# Patient Record
Sex: FEMALE | Race: WHITE | Employment: OTHER | URBAN - METROPOLITAN AREA
[De-identification: names, ages, dates, MRNs, and addresses within clinical notes are randomized per-mention and may not be internally consistent; named-entity substitution may affect disease eponyms.]

---

## 2017-03-30 RX ORDER — ESOMEPRAZOLE MAGNESIUM 40 MG/1
40 CAPSULE, DELAYED RELEASE ORAL DAILY
Qty: 30 CAPSULE | Refills: 2 | Status: SHIPPED | OUTPATIENT
Start: 2017-03-30 | End: 2017-07-01 | Stop reason: SDUPTHER

## 2017-09-21 ENCOUNTER — TELEPHONE (OUTPATIENT)
Dept: ADMINISTRATIVE | Age: 72
End: 2017-09-21

## 2017-12-04 ENCOUNTER — OFFICE VISIT (OUTPATIENT)
Dept: INTERNAL MEDICINE | Age: 72
End: 2017-12-04

## 2017-12-04 VITALS
HEIGHT: 66 IN | TEMPERATURE: 97.5 F | OXYGEN SATURATION: 96 % | DIASTOLIC BLOOD PRESSURE: 82 MMHG | SYSTOLIC BLOOD PRESSURE: 110 MMHG | RESPIRATION RATE: 16 BRPM | HEART RATE: 83 BPM

## 2017-12-04 DIAGNOSIS — F33.42 RECURRENT MAJOR DEPRESSIVE EPISODES, IN FULL REMISSION (HCC): ICD-10-CM

## 2017-12-04 DIAGNOSIS — Z00.00 ENCOUNTER FOR MEDICARE ANNUAL WELLNESS EXAM: ICD-10-CM

## 2017-12-04 DIAGNOSIS — Z00.00 ROUTINE GENERAL MEDICAL EXAMINATION AT A HEALTH CARE FACILITY: ICD-10-CM

## 2017-12-04 DIAGNOSIS — Z23 NEED FOR PROPHYLACTIC VACCINATION AGAINST STREPTOCOCCUS PNEUMONIAE (PNEUMOCOCCUS): ICD-10-CM

## 2017-12-04 PROCEDURE — 90670 PCV13 VACCINE IM: CPT | Performed by: FAMILY MEDICINE

## 2017-12-04 PROCEDURE — G0438 PPPS, INITIAL VISIT: HCPCS | Performed by: FAMILY MEDICINE

## 2017-12-04 PROCEDURE — G0009 ADMIN PNEUMOCOCCAL VACCINE: HCPCS | Performed by: FAMILY MEDICINE

## 2017-12-04 RX ORDER — GABAPENTIN 100 MG/1
2 CAPSULE ORAL 3 TIMES DAILY
COMMUNITY
Start: 2017-11-15 | End: 2019-10-07

## 2017-12-04 ASSESSMENT — LIFESTYLE VARIABLES
HOW OFTEN DURING THE LAST YEAR HAVE YOU FOUND THAT YOU WERE NOT ABLE TO STOP DRINKING ONCE YOU HAD STARTED: 0
AUDIT TOTAL SCORE: 2
HOW OFTEN DURING THE LAST YEAR HAVE YOU NEEDED AN ALCOHOLIC DRINK FIRST THING IN THE MORNING TO GET YOURSELF GOING AFTER A NIGHT OF HEAVY DRINKING: 0
HOW OFTEN DURING THE LAST YEAR HAVE YOU FAILED TO DO WHAT WAS NORMALLY EXPECTED FROM YOU BECAUSE OF DRINKING: 0
HOW OFTEN DO YOU HAVE SIX OR MORE DRINKS ON ONE OCCASION: 1
HAVE YOU OR SOMEONE ELSE BEEN INJURED AS A RESULT OF YOUR DRINKING: 0
HAS A RELATIVE, FRIEND, DOCTOR, OR ANOTHER HEALTH PROFESSIONAL EXPRESSED CONCERN ABOUT YOUR DRINKING OR SUGGESTED YOU CUT DOWN: 0
AUDIT-C TOTAL SCORE: 2
HOW OFTEN DURING THE LAST YEAR HAVE YOU BEEN UNABLE TO REMEMBER WHAT HAPPENED THE NIGHT BEFORE BECAUSE YOU HAD BEEN DRINKING: 0
HOW OFTEN DO YOU HAVE A DRINK CONTAINING ALCOHOL: 1
HOW OFTEN DURING THE LAST YEAR HAVE YOU HAD A FEELING OF GUILT OR REMORSE AFTER DRINKING: 0
HOW MANY STANDARD DRINKS CONTAINING ALCOHOL DO YOU HAVE ON A TYPICAL DAY: 0

## 2017-12-04 ASSESSMENT — PATIENT HEALTH QUESTIONNAIRE - PHQ9: SUM OF ALL RESPONSES TO PHQ QUESTIONS 1-9: 0

## 2017-12-04 ASSESSMENT — ANXIETY QUESTIONNAIRES: GAD7 TOTAL SCORE: 2

## 2017-12-04 NOTE — PATIENT INSTRUCTIONS
benefits include a comprehensive review of your medical history including lifestyle, illnesses that may run in your family, and various assessments and screenings as appropriate. After reviewing your medical record and screening and assessments performed today your provider may have ordered immunizations, labs, imaging, and/or referrals for you. A list of these orders (if applicable) as well as your Preventive Care list are included within your After Visit Summary for your review. Other Preventive Recommendations:    · A preventive eye exam performed by an eye specialist is recommended every 1-2 years to screen for glaucoma; cataracts, macular degeneration, and other eye disorders. · A preventive dental visit is recommended every 6 months. · Try to get at least 150 minutes of exercise per week or 10,000 steps per day on a pedometer . · Order or download the FREE \"Exercise & Physical Activity: Your Everyday Guide\" from [employer]. Call 3-607.555.1652 or search [employer] online. · You need 5263-4781 mg of calcium and 5842-2858 IU of vitamin D per day. It is possible to meet your calcium requirement with diet alone, but a vitamin D supplement is usually necessary to meet this goal.  · When exposed to the sun, use a sunscreen that protects against both UVA and UVB radiation with an SPF of 30 or greater. Reapply every 2 to 3 hours or after sweating, drying off with a towel, or swimming. · Always wear a seat belt when traveling in a car. Always wear a helmet when riding a bicycle or motorcycle.

## 2017-12-04 NOTE — PROGRESS NOTES
Medicare Annual Wellness Visit  Name: Odilia Root Date: 2017   MRN: [id number] Sex: Female   Age: 67 y.o. Ethnicity: Non-/Non    : 1945 Race: Dia Kilpatrick is here for Medicare AWV (Patient here today for Medicare annual wellness. )    Screenings for behavioral, psychosocial and functional/safety risks, and cognitive dysfunction are all negative except as indicated below. These results, as well as other patient data from the [address] form, are documented in Flowsheets linked to this Encounter. Allergies   Allergen Reactions    Penicillins      reaction in childhood; pt. does not remember       Prior to Visit Medications    Medication Sig Taking? Authorizing Provider   gabapentin (NEURONTIN) 100 MG capsule Take 2 capsules by mouth 3 times daily Yes Historical Provider, MD   NEXIUM 40 MG delayed release capsule TAKE 1 CAPSULE BY MOUTH DAILY Yes Cecile Vazquez PA-C   diphenoxylate-atropine (DIPHENATOL) 2.5-0.025 MG per tablet Take 1 tablet by mouth 4 times daily as needed for Diarrhea Yes Sandhya Rivers MD   Cholecalciferol (VITAMIN D3) 5000 UNITS CAPS Take 2 capsules by mouth daily. Yes Historical Provider, MD   pilocarpine (SALAGEN) 5 MG tablet Take 1 tablet by mouth nightly. Yes Historical Provider, MD   LORazepam (ATIVAN) 1 MG tablet Take 1 tablet by mouth daily as needed. Yes Historical Provider, MD   traMADol (ULTRAM) 50 MG tablet Take 1 tablet by mouth daily as needed. Yes Historical Provider, MD   traZODone (DESYREL) 50 MG tablet TAKE 1 TABLET BY MOUTH EVERY DAY AT NIGHT Yes Sandhya Rivers MD   LEXAPRO 20 MG tablet Take 1 tablet by mouth daily. Yes Sandhya Rivers MD   Multiple Vitamin (MULTIVITAMIN PO) Take  by mouth daily. Yes Historical Provider, MD   Biotin 1000 MCG TABS Take 1,000 mg by mouth daily. Yes Historical Provider, MD   B COMPLEX-C PO Take  by mouth daily.    Yes Historical Provider, MD   fish oil-omega-3 fatty acids
function. Hematology: Negative for bleeding and easy bruising. Immunology:  Negative for environmental allergies and food allergies.

## 2018-01-26 ENCOUNTER — OFFICE VISIT (OUTPATIENT)
Dept: INTERNAL MEDICINE CLINIC | Age: 73
End: 2018-01-26
Payer: MEDICARE

## 2018-01-26 VITALS
OXYGEN SATURATION: 97 % | DIASTOLIC BLOOD PRESSURE: 64 MMHG | HEART RATE: 86 BPM | SYSTOLIC BLOOD PRESSURE: 122 MMHG | RESPIRATION RATE: 16 BRPM | TEMPERATURE: 98.1 F | HEIGHT: 66 IN

## 2018-01-26 DIAGNOSIS — B35.3 TINEA PEDIS OF RIGHT FOOT: Primary | ICD-10-CM

## 2018-01-26 DIAGNOSIS — Z13.220 SCREENING, LIPID: ICD-10-CM

## 2018-01-26 PROCEDURE — 36415 COLL VENOUS BLD VENIPUNCTURE: CPT | Performed by: PHYSICIAN ASSISTANT

## 2018-01-26 PROCEDURE — 99213 OFFICE O/P EST LOW 20 MIN: CPT | Performed by: PHYSICIAN ASSISTANT

## 2018-01-26 RX ORDER — NYSTATIN 100000 U/G
CREAM TOPICAL
Qty: 45 G | Refills: 2 | Status: SHIPPED | OUTPATIENT
Start: 2018-01-26 | End: 2018-08-29 | Stop reason: ALTCHOICE

## 2018-01-26 RX ORDER — LACTULOSE 10 G/15ML
SOLUTION ORAL; RECTAL
Refills: 11 | COMMUNITY
Start: 2018-01-09 | End: 2019-10-23

## 2018-01-26 RX ORDER — CELECOXIB 100 MG/1
CAPSULE ORAL
COMMUNITY
Start: 2018-01-25 | End: 2021-01-26

## 2018-01-26 RX ORDER — NYSTATIN 100000 [USP'U]/G
POWDER TOPICAL
Qty: 1 BOTTLE | Refills: 2 | Status: SHIPPED | OUTPATIENT
Start: 2018-01-26 | End: 2018-08-29 | Stop reason: ALTCHOICE

## 2018-01-26 ASSESSMENT — ENCOUNTER SYMPTOMS
COUGH: 0
SORE THROAT: 0
CONSTIPATION: 1
EYE PAIN: 0
DOUBLE VISION: 0
NAUSEA: 0
VOMITING: 0
ABDOMINAL PAIN: 0
SHORTNESS OF BREATH: 0

## 2018-01-26 NOTE — PROGRESS NOTES
 No narrative on file     No family history on file. Allergies   Allergen Reactions    Penicillins      reaction in childhood; pt. does not remember     Current Outpatient Prescriptions   Medication Sig Dispense Refill    celecoxib (CELEBREX) 100 MG capsule TAKE 1 CAPSULE BY MOUTH TWICE DAILY.  lactulose encephalopathy 10 GM/15ML SOLN solution TAKE 30 ML BY MOUTH TWICE DAILY. 11    nystatin (MYCOSTATIN) 709740 UNIT/GM cream Apply topically 2 times daily. 45 g 2    nystatin (MYCOSTATIN) 712873 UNIT/GM powder Apply 3 times daily. 1 Bottle 2    gabapentin (NEURONTIN) 100 MG capsule Take 2 capsules by mouth 3 times daily      NEXIUM 40 MG delayed release capsule TAKE 1 CAPSULE BY MOUTH DAILY 30 capsule 3    diphenoxylate-atropine (DIPHENATOL) 2.5-0.025 MG per tablet Take 1 tablet by mouth 4 times daily as needed for Diarrhea 60 tablet 0    Cholecalciferol (VITAMIN D3) 5000 UNITS CAPS Take 2 capsules by mouth daily.  pilocarpine (SALAGEN) 5 MG tablet Take 1 tablet by mouth nightly.  LORazepam (ATIVAN) 1 MG tablet Take 1 tablet by mouth daily as needed.  traMADol (ULTRAM) 50 MG tablet Take 1 tablet by mouth daily as needed.  traZODone (DESYREL) 50 MG tablet TAKE 1 TABLET BY MOUTH EVERY DAY AT NIGHT 30 tablet 8    LEXAPRO 20 MG tablet Take 1 tablet by mouth daily. 60 tablet 0    Estrogens, Conjugated (PREMARIN VA) Place  vaginally as needed.  Multiple Vitamin (MULTIVITAMIN PO) Take  by mouth daily.  Biotin 1000 MCG TABS Take 1,000 mg by mouth daily.  B COMPLEX-C PO Take  by mouth daily.  fish oil-omega-3 fatty acids 1000 MG capsule Take 2 g by mouth daily.  Potassium 99 MG TABS Take 99 mg by mouth daily. No current facility-administered medications for this visit.         Objective    Vitals:    01/26/18 1012   BP: 122/64   Site: Left Arm   Position: Sitting   Cuff Size: Small Adult   Pulse: 86   Resp: 16   Temp: 98.1 °F (36.7 °C)   TempSrc:

## 2018-01-29 ENCOUNTER — TELEPHONE (OUTPATIENT)
Dept: INTERNAL MEDICINE CLINIC | Age: 73
End: 2018-01-29

## 2018-01-29 NOTE — TELEPHONE ENCOUNTER
Her chol numbers are good. The total cholesterol is 206 , the good HDL cholesterol is 63 and the bad LDL cholesterol is 122. The goal would be for the LDL to be less than 100. I think she I doing well.

## 2018-03-20 ENCOUNTER — OFFICE VISIT (OUTPATIENT)
Dept: INTERNAL MEDICINE CLINIC | Age: 73
End: 2018-03-20
Payer: MEDICARE

## 2018-03-20 VITALS
SYSTOLIC BLOOD PRESSURE: 110 MMHG | HEART RATE: 84 BPM | HEIGHT: 66 IN | OXYGEN SATURATION: 98 % | TEMPERATURE: 97.9 F | DIASTOLIC BLOOD PRESSURE: 70 MMHG | RESPIRATION RATE: 16 BRPM

## 2018-03-20 DIAGNOSIS — Z13.220 LIPID SCREENING: Primary | ICD-10-CM

## 2018-03-20 DIAGNOSIS — R07.89 ATYPICAL CHEST PAIN: ICD-10-CM

## 2018-03-20 PROCEDURE — 4040F PNEUMOC VAC/ADMIN/RCVD: CPT | Performed by: FAMILY MEDICINE

## 2018-03-20 PROCEDURE — 93000 ELECTROCARDIOGRAM COMPLETE: CPT | Performed by: FAMILY MEDICINE

## 2018-03-20 PROCEDURE — 99214 OFFICE O/P EST MOD 30 MIN: CPT | Performed by: FAMILY MEDICINE

## 2018-03-20 PROCEDURE — 1090F PRES/ABSN URINE INCON ASSESS: CPT | Performed by: FAMILY MEDICINE

## 2018-03-20 PROCEDURE — G8421 BMI NOT CALCULATED: HCPCS | Performed by: FAMILY MEDICINE

## 2018-03-20 PROCEDURE — G8482 FLU IMMUNIZE ORDER/ADMIN: HCPCS | Performed by: FAMILY MEDICINE

## 2018-03-20 PROCEDURE — 3014F SCREEN MAMMO DOC REV: CPT | Performed by: FAMILY MEDICINE

## 2018-03-20 PROCEDURE — 1036F TOBACCO NON-USER: CPT | Performed by: FAMILY MEDICINE

## 2018-03-20 PROCEDURE — 3017F COLORECTAL CA SCREEN DOC REV: CPT | Performed by: FAMILY MEDICINE

## 2018-03-20 PROCEDURE — G8399 PT W/DXA RESULTS DOCUMENT: HCPCS | Performed by: FAMILY MEDICINE

## 2018-03-20 PROCEDURE — 1123F ACP DISCUSS/DSCN MKR DOCD: CPT | Performed by: FAMILY MEDICINE

## 2018-03-20 PROCEDURE — G8427 DOCREV CUR MEDS BY ELIG CLIN: HCPCS | Performed by: FAMILY MEDICINE

## 2018-03-20 RX ORDER — TIZANIDINE 4 MG/1
1 TABLET ORAL DAILY
COMMUNITY
Start: 2018-02-08

## 2018-03-21 ENCOUNTER — NURSE ONLY (OUTPATIENT)
Dept: INTERNAL MEDICINE CLINIC | Age: 73
End: 2018-03-21
Payer: MEDICARE

## 2018-03-21 DIAGNOSIS — Z13.220 LIPID SCREENING: Primary | ICD-10-CM

## 2018-03-21 PROCEDURE — 36415 COLL VENOUS BLD VENIPUNCTURE: CPT | Performed by: FAMILY MEDICINE

## 2018-03-26 ENCOUNTER — TELEPHONE (OUTPATIENT)
Dept: INTERNAL MEDICINE CLINIC | Age: 73
End: 2018-03-26

## 2018-04-05 ENCOUNTER — HOSPITAL ENCOUNTER (OUTPATIENT)
Dept: NUCLEAR MEDICINE | Age: 73
Discharge: HOME | End: 2018-04-07
Payer: MEDICARE

## 2018-04-05 ENCOUNTER — HOSPITAL ENCOUNTER (OUTPATIENT)
Dept: NON INVASIVE DIAGNOSTICS | Age: 73
Discharge: HOME | End: 2018-04-05
Payer: MEDICARE

## 2018-04-05 DIAGNOSIS — R07.89 ATYPICAL CHEST PAIN: ICD-10-CM

## 2018-04-05 LAB
LV EF: 55 %
LVEF MODALITY: NORMAL

## 2018-04-05 PROCEDURE — 2580000003 HC RX 258: Performed by: FAMILY MEDICINE

## 2018-04-05 PROCEDURE — 93017 CV STRESS TEST TRACING ONLY: CPT

## 2018-04-05 PROCEDURE — 3430000000 HC RX DIAGNOSTIC RADIOPHARMACEUTICAL: Performed by: FAMILY MEDICINE

## 2018-04-05 PROCEDURE — 93306 TTE W/DOPPLER COMPLETE: CPT

## 2018-04-05 PROCEDURE — A9502 TC99M TETROFOSMIN: HCPCS | Performed by: FAMILY MEDICINE

## 2018-04-05 PROCEDURE — 78452 HT MUSCLE IMAGE SPECT MULT: CPT

## 2018-04-05 RX ORDER — SODIUM CHLORIDE 0.9 % (FLUSH) 0.9 %
10 SYRINGE (ML) INJECTION PRN
Status: DISCONTINUED | OUTPATIENT
Start: 2018-04-05 | End: 2018-04-08 | Stop reason: HOSPADM

## 2018-04-05 RX ADMIN — TETROFOSMIN 34.2 MILLICURIE: 0.23 INJECTION, POWDER, LYOPHILIZED, FOR SOLUTION INTRAVENOUS at 12:25

## 2018-04-05 RX ADMIN — Medication 10 ML: at 09:40

## 2018-04-05 RX ADMIN — Medication 10 ML: at 12:25

## 2018-04-05 RX ADMIN — TETROFOSMIN 11 MILLICURIE: 0.23 INJECTION, POWDER, LYOPHILIZED, FOR SOLUTION INTRAVENOUS at 09:40

## 2018-04-06 LAB
CHOLESTEROL, TOTAL: 209 MG/DL
CHOLESTEROL/HDL RATIO: 3.5
HDLC SERPL-MCNC: 59 MG/DL (ref 35–70)
LDL CHOLESTEROL CALCULATED: 129 MG/DL (ref 0–160)
TRIGL SERPL-MCNC: 105 MG/DL
VLDLC SERPL CALC-MCNC: NORMAL MG/DL

## 2018-08-29 ENCOUNTER — OFFICE VISIT (OUTPATIENT)
Dept: INTERNAL MEDICINE CLINIC | Age: 73
End: 2018-08-29
Payer: MEDICARE

## 2018-08-29 VITALS
OXYGEN SATURATION: 98 % | DIASTOLIC BLOOD PRESSURE: 64 MMHG | HEART RATE: 69 BPM | TEMPERATURE: 98 F | RESPIRATION RATE: 16 BRPM | SYSTOLIC BLOOD PRESSURE: 100 MMHG | BODY MASS INDEX: 20.83 KG/M2 | WEIGHT: 129.6 LBS | HEIGHT: 66 IN

## 2018-08-29 DIAGNOSIS — M35.00 SJOGREN'S SYNDROME, WITH UNSPECIFIED ORGAN INVOLVEMENT (HCC): Primary | ICD-10-CM

## 2018-08-29 DIAGNOSIS — Z23 NEED FOR PROPHYLACTIC VACCINATION AND INOCULATION AGAINST VARICELLA: ICD-10-CM

## 2018-08-29 DIAGNOSIS — F33.42 MAJOR DEPRESSIVE DISORDER, RECURRENT, IN FULL REMISSION (HCC): ICD-10-CM

## 2018-08-29 DIAGNOSIS — M81.0 SENILE OSTEOPOROSIS: ICD-10-CM

## 2018-08-29 DIAGNOSIS — F32.5 MAJOR DEPRESSIVE DISORDER IN FULL REMISSION, UNSPECIFIED WHETHER RECURRENT (HCC): ICD-10-CM

## 2018-08-29 DIAGNOSIS — G25.81 RESTLESS LEGS SYNDROME (RLS): ICD-10-CM

## 2018-08-29 DIAGNOSIS — H92.02 LEFT EAR PAIN: ICD-10-CM

## 2018-08-29 DIAGNOSIS — H61.22 EXCESSIVE CERUMEN IN LEFT EAR CANAL: ICD-10-CM

## 2018-08-29 PROCEDURE — 1036F TOBACCO NON-USER: CPT | Performed by: FAMILY MEDICINE

## 2018-08-29 PROCEDURE — 1090F PRES/ABSN URINE INCON ASSESS: CPT | Performed by: FAMILY MEDICINE

## 2018-08-29 PROCEDURE — 69210 REMOVE IMPACTED EAR WAX UNI: CPT | Performed by: FAMILY MEDICINE

## 2018-08-29 PROCEDURE — G8427 DOCREV CUR MEDS BY ELIG CLIN: HCPCS | Performed by: FAMILY MEDICINE

## 2018-08-29 PROCEDURE — G8420 CALC BMI NORM PARAMETERS: HCPCS | Performed by: FAMILY MEDICINE

## 2018-08-29 PROCEDURE — 1101F PT FALLS ASSESS-DOCD LE1/YR: CPT | Performed by: FAMILY MEDICINE

## 2018-08-29 PROCEDURE — 99214 OFFICE O/P EST MOD 30 MIN: CPT | Performed by: FAMILY MEDICINE

## 2018-08-29 PROCEDURE — 1123F ACP DISCUSS/DSCN MKR DOCD: CPT | Performed by: FAMILY MEDICINE

## 2018-08-29 PROCEDURE — 3017F COLORECTAL CA SCREEN DOC REV: CPT | Performed by: FAMILY MEDICINE

## 2018-08-29 PROCEDURE — G8399 PT W/DXA RESULTS DOCUMENT: HCPCS | Performed by: FAMILY MEDICINE

## 2018-08-29 PROCEDURE — 4040F PNEUMOC VAC/ADMIN/RCVD: CPT | Performed by: FAMILY MEDICINE

## 2018-08-29 NOTE — PROGRESS NOTES
Patient: Reina Raya    YOB: 1945    Date: 8/29/18    Chief Complaint   Patient presents with    Depression     6 month follow up.  Headache     She complains of headache today, neck pain. Patient Active Problem List    Diagnosis Date Noted    Senile osteoporosis     Gastroesophageal reflux disease without esophagitis 02/24/2016    Seborrheic keratosis 11/24/2015    Irritable bowel syndrome     Osteoarthritis of ankle or foot 07/23/2015    Tear film insufficiency 12/03/2014    Depression 05/21/2014    Osteoarthritis 05/21/2014    Sjogren's syndrome ([address] 05/09/2013    Fibromyalgia 05/09/2013    Restless legs syndrome (RLS) 08/12/2005    Persistent insomnia 08/12/2005       Allergies   Allergen Reactions    Penicillins      reaction in childhood; pt. does not remember       Vitals:    08/29/18 1552   BP: 100/64   Site: Right Arm   Position: Sitting   Cuff Size: Small Adult   Pulse: 69   Resp: 16   Temp: 98 °F (36.7 °C)   TempSrc: Oral   SpO2: 98%   Weight: 129 lb 9.6 oz (58.8 kg)   Height: 5' 6\" (1.676 m)      Body mass index is 20.92 kg/m². HPI    Her biggest issues that she feels she has some neck pain today she has gotten a massage yesterday and is just came on her and just feels tightness and she thinks related to the weather. It's giving her a bit of a headache. No numbness tingling weakness no cough no shortness of breath no nausea vomiting or diarrhea. We reviewed her preventive medicine issues and updated which she needs today. An itchy spot on the left ear the case bothering her she hasn't seen anything there which is one to mention that to me  Review of Systems    Constitutional:positive for fatigue, fever and sweats. HEENT: Negative for eye discharge and vision loss. Negative for ear drainage, hearing loss and nasal drainage. Respiratory: Negative for cough, dyspnea and wheezing.   Cardiovascular:  Negative for chest pain, claudication and irregular depressive disorder, recurrent, in full remission ([facility] [address]     7. Left ear pain     8. Excessive cerumen in left ear canal  64577 - ID REMOVE IMPACTED EAR WAX               Plan:  Current Outpatient Prescriptions   Medication Sig Dispense Refill    zoster recombinant adjuvanted vaccine (SHINGRIX) 50 MCG SUSR injection 50 MCG IM then repeat 2-6 months. 0.5 mL 1    neomycin-polymyxin-hydrocortisone (CORTISPORIN) 3.5-25806-4 otic solution Place 3 drops into the left ear 3 times daily 1 Bottle 0    tiZANidine (ZANAFLEX) 4 MG tablet Take 1 tablet by mouth daily      celecoxib (CELEBREX) 100 MG capsule TAKE 1 CAPSULE BY MOUTH TWICE DAILY.  lactulose encephalopathy 10 GM/15ML SOLN solution TAKE 30 ML BY MOUTH TWICE DAILY. 11    gabapentin (NEURONTIN) 100 MG capsule Take 2 capsules by mouth 3 times daily      NEXIUM 40 MG delayed release capsule TAKE 1 CAPSULE BY MOUTH DAILY 30 capsule 3    Cholecalciferol (VITAMIN D3) 5000 UNITS CAPS Take 2 capsules by mouth daily.  pilocarpine (SALAGEN) 5 MG tablet Take 1 tablet by mouth nightly.  LORazepam (ATIVAN) 1 MG tablet Take 1 tablet by mouth daily as needed.  traMADol (ULTRAM) 50 MG tablet Take 1 tablet by mouth daily as needed.  traZODone (DESYREL) 50 MG tablet TAKE 1 TABLET BY MOUTH EVERY DAY AT NIGHT 30 tablet 8    LEXAPRO 20 MG tablet Take 1 tablet by mouth daily. 60 tablet 0    Estrogens, Conjugated (PREMARIN VA) Place  vaginally as needed.  Multiple Vitamin (MULTIVITAMIN PO) Take  by mouth daily.  Biotin 1000 MCG TABS Take 1,000 mg by mouth daily.  B COMPLEX-C PO Take  by mouth daily.  fish oil-omega-3 fatty acids 1000 MG capsule Take 2 g by mouth daily.  Potassium 99 MG TABS Take 99 mg by mouth daily. No current facility-administered medications for this visit.       Orders Placed This Encounter   Procedures    DEXA Bone Density Axial Skeleton     Standing Status:   Future     Standing Expiration

## 2018-09-07 ENCOUNTER — HOSPITAL ENCOUNTER (OUTPATIENT)
Dept: WOMENS IMAGING | Age: 73
Discharge: HOME | End: 2018-09-09
Payer: MEDICARE

## 2018-09-07 DIAGNOSIS — M81.0 SENILE OSTEOPOROSIS: ICD-10-CM

## 2018-09-07 PROCEDURE — 77080 DXA BONE DENSITY AXIAL: CPT

## 2019-01-30 ENCOUNTER — TELEPHONE (OUTPATIENT)
Dept: INTERNAL MEDICINE CLINIC | Age: 74
End: 2019-01-30

## 2019-10-07 ENCOUNTER — OFFICE VISIT (OUTPATIENT)
Dept: FAMILY MEDICINE CLINIC | Age: 74
End: 2019-10-07
Payer: MEDICARE

## 2019-10-07 VITALS
TEMPERATURE: 98.1 F | WEIGHT: 126 LBS | HEART RATE: 69 BPM | OXYGEN SATURATION: 99 % | HEIGHT: 66 IN | RESPIRATION RATE: 16 BRPM | SYSTOLIC BLOOD PRESSURE: 100 MMHG | BODY MASS INDEX: 20.25 KG/M2 | DIASTOLIC BLOOD PRESSURE: 80 MMHG

## 2019-10-07 DIAGNOSIS — R53.83 FATIGUE, UNSPECIFIED TYPE: ICD-10-CM

## 2019-10-07 DIAGNOSIS — Z12.31 VISIT FOR SCREENING MAMMOGRAM: ICD-10-CM

## 2019-10-07 DIAGNOSIS — M81.0 SENILE OSTEOPOROSIS: ICD-10-CM

## 2019-10-07 DIAGNOSIS — M35.00 SJOGREN'S SYNDROME, WITH UNSPECIFIED ORGAN INVOLVEMENT (HCC): ICD-10-CM

## 2019-10-07 DIAGNOSIS — R10.31 RIGHT LOWER QUADRANT ABDOMINAL PAIN: ICD-10-CM

## 2019-10-07 DIAGNOSIS — E78.5 DYSLIPIDEMIA: ICD-10-CM

## 2019-10-07 DIAGNOSIS — M54.50 CHRONIC BILATERAL LOW BACK PAIN WITHOUT SCIATICA: ICD-10-CM

## 2019-10-07 DIAGNOSIS — F32.5 MAJOR DEPRESSIVE DISORDER, SINGLE EPISODE, IN FULL REMISSION (HCC): ICD-10-CM

## 2019-10-07 DIAGNOSIS — F32.5 MAJOR DEPRESSIVE DISORDER IN FULL REMISSION, UNSPECIFIED WHETHER RECURRENT (HCC): ICD-10-CM

## 2019-10-07 DIAGNOSIS — G89.29 CHRONIC BILATERAL LOW BACK PAIN WITHOUT SCIATICA: ICD-10-CM

## 2019-10-07 DIAGNOSIS — M54.2 NECK PAIN: Primary | ICD-10-CM

## 2019-10-07 PROCEDURE — G8482 FLU IMMUNIZE ORDER/ADMIN: HCPCS | Performed by: FAMILY MEDICINE

## 2019-10-07 PROCEDURE — 1036F TOBACCO NON-USER: CPT | Performed by: FAMILY MEDICINE

## 2019-10-07 PROCEDURE — 1090F PRES/ABSN URINE INCON ASSESS: CPT | Performed by: FAMILY MEDICINE

## 2019-10-07 PROCEDURE — 99214 OFFICE O/P EST MOD 30 MIN: CPT | Performed by: FAMILY MEDICINE

## 2019-10-07 PROCEDURE — 3017F COLORECTAL CA SCREEN DOC REV: CPT | Performed by: FAMILY MEDICINE

## 2019-10-07 PROCEDURE — G8420 CALC BMI NORM PARAMETERS: HCPCS | Performed by: FAMILY MEDICINE

## 2019-10-07 PROCEDURE — G8427 DOCREV CUR MEDS BY ELIG CLIN: HCPCS | Performed by: FAMILY MEDICINE

## 2019-10-07 PROCEDURE — 3288F FALL RISK ASSESSMENT DOCD: CPT | Performed by: FAMILY MEDICINE

## 2019-10-07 PROCEDURE — 1123F ACP DISCUSS/DSCN MKR DOCD: CPT | Performed by: FAMILY MEDICINE

## 2019-10-07 PROCEDURE — 4040F PNEUMOC VAC/ADMIN/RCVD: CPT | Performed by: FAMILY MEDICINE

## 2019-10-07 PROCEDURE — G8399 PT W/DXA RESULTS DOCUMENT: HCPCS | Performed by: FAMILY MEDICINE

## 2019-10-07 RX ORDER — ESOMEPRAZOLE MAGNESIUM 40 MG/1
CAPSULE, DELAYED RELEASE ORAL
Qty: 90 CAPSULE | Refills: 3 | Status: SHIPPED | OUTPATIENT
Start: 2019-10-07

## 2019-10-07 RX ORDER — GABAPENTIN 300 MG/1
CAPSULE ORAL
Refills: 5 | COMMUNITY
Start: 2019-09-18

## 2019-10-09 DIAGNOSIS — E78.5 DYSLIPIDEMIA: ICD-10-CM

## 2019-10-09 DIAGNOSIS — M81.0 SENILE OSTEOPOROSIS: ICD-10-CM

## 2019-10-09 DIAGNOSIS — R53.83 FATIGUE, UNSPECIFIED TYPE: ICD-10-CM

## 2019-10-09 DIAGNOSIS — M35.00 SJOGREN'S SYNDROME, WITH UNSPECIFIED ORGAN INVOLVEMENT (HCC): ICD-10-CM

## 2019-10-09 LAB
ALBUMIN SERPL-MCNC: 4.3 G/DL (ref 3.5–4.6)
ALP BLD-CCNC: 58 U/L (ref 40–130)
ALT SERPL-CCNC: 13 U/L (ref 0–33)
ANION GAP SERPL CALCULATED.3IONS-SCNC: 13 MEQ/L (ref 9–15)
AST SERPL-CCNC: 25 U/L (ref 0–35)
BILIRUB SERPL-MCNC: 0.7 MG/DL (ref 0.2–0.7)
BUN BLDV-MCNC: 16 MG/DL (ref 8–23)
CALCIUM SERPL-MCNC: 9.4 MG/DL (ref 8.5–9.9)
CHLORIDE BLD-SCNC: 102 MEQ/L (ref 95–107)
CHOLESTEROL, FASTING: 185 MG/DL (ref 0–199)
CO2: 28 MEQ/L (ref 20–31)
CREAT SERPL-MCNC: 0.65 MG/DL (ref 0.5–0.9)
GFR AFRICAN AMERICAN: >60
GFR NON-AFRICAN AMERICAN: >60
GLOBULIN: 2.4 G/DL (ref 2.3–3.5)
GLUCOSE BLD-MCNC: 70 MG/DL (ref 70–99)
HDLC SERPL-MCNC: 61 MG/DL (ref 40–59)
LDL CHOLESTEROL CALCULATED: 104 MG/DL (ref 0–129)
POTASSIUM SERPL-SCNC: 4.4 MEQ/L (ref 3.4–4.9)
SODIUM BLD-SCNC: 143 MEQ/L (ref 135–144)
TOTAL PROTEIN: 6.7 G/DL (ref 6.3–8)
TRIGLYCERIDE, FASTING: 100 MG/DL (ref 0–150)
TSH REFLEX: 2.67 UIU/ML (ref 0.44–3.86)

## 2019-10-23 ENCOUNTER — HOSPITAL ENCOUNTER (OUTPATIENT)
Dept: ULTRASOUND IMAGING | Age: 74
Discharge: HOME | End: 2019-10-25
Payer: MEDICARE

## 2019-10-23 ENCOUNTER — OFFICE VISIT (OUTPATIENT)
Dept: FAMILY MEDICINE CLINIC | Age: 74
End: 2019-10-23
Payer: MEDICARE

## 2019-10-23 VITALS
HEIGHT: 67 IN | SYSTOLIC BLOOD PRESSURE: 116 MMHG | OXYGEN SATURATION: 96 % | TEMPERATURE: 97.9 F | HEART RATE: 64 BPM | WEIGHT: 131 LBS | RESPIRATION RATE: 16 BRPM | BODY MASS INDEX: 20.56 KG/M2 | DIASTOLIC BLOOD PRESSURE: 72 MMHG

## 2019-10-23 DIAGNOSIS — Z00.00 ROUTINE GENERAL MEDICAL EXAMINATION AT A HEALTH CARE FACILITY: Primary | ICD-10-CM

## 2019-10-23 DIAGNOSIS — R10.31 RIGHT LOWER QUADRANT ABDOMINAL PAIN: ICD-10-CM

## 2019-10-23 PROCEDURE — 3017F COLORECTAL CA SCREEN DOC REV: CPT | Performed by: FAMILY MEDICINE

## 2019-10-23 PROCEDURE — 1123F ACP DISCUSS/DSCN MKR DOCD: CPT | Performed by: FAMILY MEDICINE

## 2019-10-23 PROCEDURE — G8482 FLU IMMUNIZE ORDER/ADMIN: HCPCS | Performed by: FAMILY MEDICINE

## 2019-10-23 PROCEDURE — 4040F PNEUMOC VAC/ADMIN/RCVD: CPT | Performed by: FAMILY MEDICINE

## 2019-10-23 PROCEDURE — 76705 ECHO EXAM OF ABDOMEN: CPT

## 2019-10-23 PROCEDURE — G0439 PPPS, SUBSEQ VISIT: HCPCS | Performed by: FAMILY MEDICINE

## 2019-10-23 ASSESSMENT — LIFESTYLE VARIABLES
HOW OFTEN DURING THE LAST YEAR HAVE YOU FOUND THAT YOU WERE NOT ABLE TO STOP DRINKING ONCE YOU HAD STARTED: 0
HOW OFTEN DURING THE LAST YEAR HAVE YOU NEEDED AN ALCOHOLIC DRINK FIRST THING IN THE MORNING TO GET YOURSELF GOING AFTER A NIGHT OF HEAVY DRINKING: 0
HOW OFTEN DURING THE LAST YEAR HAVE YOU BEEN UNABLE TO REMEMBER WHAT HAPPENED THE NIGHT BEFORE BECAUSE YOU HAD BEEN DRINKING: 0
AUDIT TOTAL SCORE: 1
AUDIT-C TOTAL SCORE: 1
HOW OFTEN DURING THE LAST YEAR HAVE YOU HAD A FEELING OF GUILT OR REMORSE AFTER DRINKING: 0
HOW OFTEN DO YOU HAVE A DRINK CONTAINING ALCOHOL: 1
HAVE YOU OR SOMEONE ELSE BEEN INJURED AS A RESULT OF YOUR DRINKING: 0
HOW OFTEN DURING THE LAST YEAR HAVE YOU FAILED TO DO WHAT WAS NORMALLY EXPECTED FROM YOU BECAUSE OF DRINKING: 0
HOW MANY STANDARD DRINKS CONTAINING ALCOHOL DO YOU HAVE ON A TYPICAL DAY: 0
HAS A RELATIVE, FRIEND, DOCTOR, OR ANOTHER HEALTH PROFESSIONAL EXPRESSED CONCERN ABOUT YOUR DRINKING OR SUGGESTED YOU CUT DOWN: 0
HOW OFTEN DO YOU HAVE SIX OR MORE DRINKS ON ONE OCCASION: 0

## 2019-10-23 ASSESSMENT — PATIENT HEALTH QUESTIONNAIRE - PHQ9
SUM OF ALL RESPONSES TO PHQ QUESTIONS 1-9: 0
SUM OF ALL RESPONSES TO PHQ QUESTIONS 1-9: 0

## 2021-01-26 ENCOUNTER — OFFICE VISIT (OUTPATIENT)
Dept: FAMILY MEDICINE CLINIC | Age: 76
End: 2021-01-26
Payer: MEDICARE

## 2021-01-26 VITALS
OXYGEN SATURATION: 97 % | RESPIRATION RATE: 16 BRPM | WEIGHT: 130 LBS | BODY MASS INDEX: 20.89 KG/M2 | SYSTOLIC BLOOD PRESSURE: 100 MMHG | HEIGHT: 66 IN | DIASTOLIC BLOOD PRESSURE: 70 MMHG | TEMPERATURE: 98 F | HEART RATE: 90 BPM

## 2021-01-26 DIAGNOSIS — K64.4 EXTERNAL HEMORRHOID: ICD-10-CM

## 2021-01-26 DIAGNOSIS — M35.01 KERATOCONJUNCTIVITIS SICCA, IN SJOGREN'S SYNDROME (HCC): ICD-10-CM

## 2021-01-26 DIAGNOSIS — M81.0 SENILE OSTEOPOROSIS: ICD-10-CM

## 2021-01-26 DIAGNOSIS — K62.5 RECTAL BLEEDING: Primary | ICD-10-CM

## 2021-01-26 DIAGNOSIS — F32.5 MAJOR DEPRESSIVE DISORDER, SINGLE EPISODE, IN FULL REMISSION (HCC): ICD-10-CM

## 2021-01-26 PROCEDURE — 1090F PRES/ABSN URINE INCON ASSESS: CPT | Performed by: FAMILY MEDICINE

## 2021-01-26 PROCEDURE — G8399 PT W/DXA RESULTS DOCUMENT: HCPCS | Performed by: FAMILY MEDICINE

## 2021-01-26 PROCEDURE — G8420 CALC BMI NORM PARAMETERS: HCPCS | Performed by: FAMILY MEDICINE

## 2021-01-26 PROCEDURE — 4040F PNEUMOC VAC/ADMIN/RCVD: CPT | Performed by: FAMILY MEDICINE

## 2021-01-26 PROCEDURE — 1036F TOBACCO NON-USER: CPT | Performed by: FAMILY MEDICINE

## 2021-01-26 PROCEDURE — 99214 OFFICE O/P EST MOD 30 MIN: CPT | Performed by: FAMILY MEDICINE

## 2021-01-26 PROCEDURE — G8482 FLU IMMUNIZE ORDER/ADMIN: HCPCS | Performed by: FAMILY MEDICINE

## 2021-01-26 PROCEDURE — 1123F ACP DISCUSS/DSCN MKR DOCD: CPT | Performed by: FAMILY MEDICINE

## 2021-01-26 PROCEDURE — 3017F COLORECTAL CA SCREEN DOC REV: CPT | Performed by: FAMILY MEDICINE

## 2021-01-26 PROCEDURE — G8427 DOCREV CUR MEDS BY ELIG CLIN: HCPCS | Performed by: FAMILY MEDICINE

## 2021-01-26 RX ORDER — HYDROCORTISONE 25 MG/G
CREAM TOPICAL
Qty: 1 TUBE | Refills: 3 | Status: SHIPPED | OUTPATIENT
Start: 2021-01-26

## 2021-01-26 RX ORDER — LIFITEGRAST 50 MG/ML
1 SOLUTION/ DROPS OPHTHALMIC
COMMUNITY
Start: 2021-01-18 | End: 2021-02-02 | Stop reason: ALTCHOICE

## 2021-01-26 RX ORDER — ESCITALOPRAM OXALATE 10 MG/1
10 TABLET ORAL DAILY
COMMUNITY

## 2021-01-26 RX ORDER — DULOXETIN HYDROCHLORIDE 30 MG/1
1 CAPSULE, DELAYED RELEASE ORAL DAILY
COMMUNITY
Start: 2020-05-08

## 2021-01-26 ASSESSMENT — PATIENT HEALTH QUESTIONNAIRE - PHQ9
2. FEELING DOWN, DEPRESSED OR HOPELESS: 0
1. LITTLE INTEREST OR PLEASURE IN DOING THINGS: 0
SUM OF ALL RESPONSES TO PHQ QUESTIONS 1-9: 0

## 2021-01-26 NOTE — PROGRESS NOTES
Patient: Marce Antonio (: 1945) is a 76 y.o. female,Established patient, here for evaluation of the following chief complaint(s):  Chief Complaint   Patient presents with    Rectal Bleeding     She states she had some rectal bleeding 2 days ago, she states it is from her hemorroids.  Joint Pain     She complains of having all over joint pain. Date: 21    Allergies   Allergen Reactions    Penicillins      reaction in childhood; pt. does not remember       Vitals:    21 0955   BP: 100/70   Site: Right Upper Arm   Position: Sitting   Cuff Size: Small Adult   Pulse: 90   Resp: 16   Temp: 98 °F (36.7 °C)   TempSrc: Oral   SpO2: 97%   Weight: 130 lb (59 kg)   Height: 5' 6\" (1.676 m)      Body mass index is 20.98 kg/m². PHQ Scores 2021 10/23/2019 2017 10/13/2015 2014   PHQ2 Score 0 0 0 0 0   PHQ9 Score 0 0 0 0 0     Interpretation of Total Score Depression Severity: 1-4 = Minimal depression, 5-9 = Mild depression, 10-14 = Moderate depression, 15-19 = Moderately severe depression, 20-27 = Severe depression    HPI  She has a history of external hemorrhoids and she has had some rectal bleeding over the last week. No fever chills cramping nausea or vomiting she is suffering from tremendous constipation her whole life and she gets regular colonic stent done somewhere. She has no fever chills cough nausea or vomiting she has a lot of pain and achiness with her connective tissue disease she is not been thinking is clearly her memory has not been as good. She did struggle for information talking to me      Review of Systems    Constitutional: Negative for fatigue, fever and sweats. HEENT: Negative for eye discharge and vision loss. Negative for ear drainage, hearing loss and nasal drainage. Respiratory: Negative for cough, dyspnea and wheezing. Cardiovascular:  Negative for chest pain, claudication and irregular heartbeat/palpitations.   Gastrointestinal: Negative for abdominal pain, nausea, positive for constipation and diarrhea. Genitourinary: Negative for dysuria, patient had a hysterectomy. Metabolic/Endocrine: Negative for cold intolerance, heat intolerance, polydipsia and polyphagia. No unintended weight loss or weight gain. Neuro/Psychiatric: Negative for gait disturbance. Negative for psychiatric symptoms. Dermatologic: Negative for pruritus and rash. Musculoskeletal:positive for bone/joint symptoms. No numbness or tingling. No loss of function. Hematology: Negative for bleeding and easy bruising. Immunology:  Negative for environmental allergies and food allergies. Physical Exam    Patient's medication, allergies, past medical, surgical, social and family histories were reviewed and updated as appropriate. PHYSICAL EXAM     General: Alert oriented pleasant cooperative no acute distress she did have some trouble with word finding color good nonicteric  Neck: Soft nontender no adenopathy no carotid bruits  Lungs: Clear to auscultation without wheezes rhonchi or rales  Heart: Regular rate and rhythm without murmurs rubs or gallops  Extremities: She has a fine tremor now but not particularly pill-rolling especially in her left arm. Rectal exam: She has prominent external hemorrhoids that are not inflamed not bleeding and not thrombosed. No tumors were palpated no fissures minimal pain and it was OB negative stool. Assessment:   Diagnosis Orders   1. Rectal bleeding  CBC Auto Differential    hydrocortisone (ANUSOL-HC) 2.5 % CREA rectal cream   2. Senile osteoporosis  DEXA BONE DENSITY AXIAL SKELETON    Comprehensive Metabolic Panel   3. Keratoconjunctivitis sicca, in Sjogren's syndrome ([facility])  Lifitegrast (XIIDRA) 5 % SOLN    Comprehensive Metabolic Panel   4.  Major depressive disorder, single episode, in full remission ([facility])  escitalopram (LEXAPRO) 10 MG tablet    DULoxetine (CYMBALTA) 30 MG extended release capsule    Comprehensive Metabolic Panel 5. External hemorrhoid  hydrocortisone (ANUSOL-HC) 2.5 % CREA rectal cream         On this date 01/26/21 I have spent 35 minutes reviewing previous notes, test results and face to face with the patient discussing the diagnosis and importance of compliance with the treatment plan. Plan:  Current Outpatient Medications   Medication Sig Dispense Refill    escitalopram (LEXAPRO) 10 MG tablet Take 10 mg by mouth daily      DULoxetine (CYMBALTA) 30 MG extended release capsule Take 1 capsule by mouth daily      Lifitegrast (XIIDRA) 5 % SOLN Apply 1 drop to eye Twice a Week      hydrocortisone (ANUSOL-HC) 2.5 % CREA rectal cream Rub in twice a day as needed for itch and burn 1 Tube 3    gabapentin (NEURONTIN) 300 MG capsule TAKE ONE CAPSULE BY MOUTH 3 TIMES A DAY  5    esomeprazole (NEXIUM) 40 MG delayed release capsule TAKE 1 CAPSULE BY MOUTH DAILY 90 capsule 3    tiZANidine (ZANAFLEX) 4 MG tablet Take 1 tablet by mouth daily      Cholecalciferol (VITAMIN D3) 5000 UNITS CAPS Take 2 capsules by mouth daily.  pilocarpine (SALAGEN) 5 MG tablet Take 1 tablet by mouth nightly.  LORazepam (ATIVAN) 1 MG tablet Take 1 tablet by mouth daily as needed.  traMADol (ULTRAM) 50 MG tablet Take 1 tablet by mouth daily as needed.  traZODone (DESYREL) 50 MG tablet TAKE 1 TABLET BY MOUTH EVERY DAY AT NIGHT 30 tablet 8    Multiple Vitamin (MULTIVITAMIN PO) Take  by mouth daily.  Biotin 1000 MCG TABS Take 1,000 mg by mouth daily.  B COMPLEX-C PO Take  by mouth daily.  fish oil-omega-3 fatty acids 1000 MG capsule Take 2 g by mouth daily.  Potassium 99 MG TABS Take 99 mg by mouth daily. No current facility-administered medications for this visit.       Orders Placed This Encounter   Procedures    DEXA BONE DENSITY AXIAL SKELETON     Standing Status:   Future     Standing Expiration Date:   1/26/2022     Order Specific Question:   Reason for exam:     Answer:   routine    Comprehensive Metabolic Panel     Standing Status:   Future     Standing Expiration Date:   1/26/2022    CBC Auto Differential     Standing Status:   Future     Standing Expiration Date:   1/26/2022       Orders Placed This Encounter   Medications    hydrocortisone (ANUSOL-HC) 2.5 % CREA rectal cream     Sig: Rub in twice a day as needed for itch and burn     Dispense:  1 Tube     Refill:  3              Return in about 1 year (around 1/26/2022). An electronic signature was used to authenticate this note.   Dr. Dusty Pelletier      1/26/21  11:58 AM

## 2021-02-01 DIAGNOSIS — F32.5 MAJOR DEPRESSIVE DISORDER, SINGLE EPISODE, IN FULL REMISSION (HCC): ICD-10-CM

## 2021-02-01 DIAGNOSIS — M81.0 SENILE OSTEOPOROSIS: ICD-10-CM

## 2021-02-01 DIAGNOSIS — K62.5 RECTAL BLEEDING: ICD-10-CM

## 2021-02-01 DIAGNOSIS — M35.01 KERATOCONJUNCTIVITIS SICCA, IN SJOGREN'S SYNDROME (HCC): ICD-10-CM

## 2021-02-01 LAB
ALBUMIN SERPL-MCNC: 4.2 G/DL (ref 3.5–4.6)
ALP BLD-CCNC: 60 U/L (ref 40–130)
ALT SERPL-CCNC: 15 U/L (ref 0–33)
ANION GAP SERPL CALCULATED.3IONS-SCNC: 9 MEQ/L (ref 9–15)
AST SERPL-CCNC: 22 U/L (ref 0–35)
BASOPHILS ABSOLUTE: 0 K/UL (ref 0–0.2)
BASOPHILS RELATIVE PERCENT: 0.2 %
BILIRUB SERPL-MCNC: 0.5 MG/DL (ref 0.2–0.7)
BUN BLDV-MCNC: 20 MG/DL (ref 8–23)
CALCIUM SERPL-MCNC: 9.3 MG/DL (ref 8.5–9.9)
CHLORIDE BLD-SCNC: 104 MEQ/L (ref 95–107)
CO2: 29 MEQ/L (ref 20–31)
CREAT SERPL-MCNC: 0.59 MG/DL (ref 0.5–0.9)
EOSINOPHILS ABSOLUTE: 0 K/UL (ref 0–0.7)
EOSINOPHILS RELATIVE PERCENT: 1 %
GFR AFRICAN AMERICAN: >60
GFR NON-AFRICAN AMERICAN: >60
GLOBULIN: 2.1 G/DL (ref 2.3–3.5)
GLUCOSE BLD-MCNC: 84 MG/DL (ref 70–99)
HCT VFR BLD CALC: 35.3 % (ref 37–47)
HEMOGLOBIN: 12 G/DL (ref 12–16)
LYMPHOCYTES ABSOLUTE: 1.7 K/UL (ref 1–4.8)
LYMPHOCYTES RELATIVE PERCENT: 40.2 %
MCH RBC QN AUTO: 31.9 PG (ref 27–31.3)
MCHC RBC AUTO-ENTMCNC: 33.8 % (ref 33–37)
MCV RBC AUTO: 94.4 FL (ref 82–100)
MONOCYTES ABSOLUTE: 0.5 K/UL (ref 0.2–0.8)
MONOCYTES RELATIVE PERCENT: 12.8 %
NEUTROPHILS ABSOLUTE: 1.9 K/UL (ref 1.4–6.5)
NEUTROPHILS RELATIVE PERCENT: 45.8 %
PDW BLD-RTO: 13.6 % (ref 11.5–14.5)
PLATELET # BLD: 232 K/UL (ref 130–400)
POTASSIUM SERPL-SCNC: 4.4 MEQ/L (ref 3.4–4.9)
RBC # BLD: 3.74 M/UL (ref 4.2–5.4)
SODIUM BLD-SCNC: 142 MEQ/L (ref 135–144)
TOTAL PROTEIN: 6.3 G/DL (ref 6.3–8)
WBC # BLD: 4.1 K/UL (ref 4.8–10.8)

## 2021-02-02 ENCOUNTER — OFFICE VISIT (OUTPATIENT)
Dept: FAMILY MEDICINE CLINIC | Age: 76
End: 2021-02-02
Payer: MEDICARE

## 2021-02-02 VITALS
OXYGEN SATURATION: 98 % | DIASTOLIC BLOOD PRESSURE: 70 MMHG | HEART RATE: 82 BPM | WEIGHT: 128 LBS | SYSTOLIC BLOOD PRESSURE: 108 MMHG | BODY MASS INDEX: 20.57 KG/M2 | TEMPERATURE: 97.9 F | HEIGHT: 66 IN

## 2021-02-02 DIAGNOSIS — Z00.00 ROUTINE GENERAL MEDICAL EXAMINATION AT A HEALTH CARE FACILITY: Primary | ICD-10-CM

## 2021-02-02 PROCEDURE — 3017F COLORECTAL CA SCREEN DOC REV: CPT | Performed by: FAMILY MEDICINE

## 2021-02-02 PROCEDURE — 1123F ACP DISCUSS/DSCN MKR DOCD: CPT | Performed by: FAMILY MEDICINE

## 2021-02-02 PROCEDURE — G8482 FLU IMMUNIZE ORDER/ADMIN: HCPCS | Performed by: FAMILY MEDICINE

## 2021-02-02 PROCEDURE — 4040F PNEUMOC VAC/ADMIN/RCVD: CPT | Performed by: FAMILY MEDICINE

## 2021-02-02 PROCEDURE — G0439 PPPS, SUBSEQ VISIT: HCPCS | Performed by: FAMILY MEDICINE

## 2021-02-02 SDOH — ECONOMIC STABILITY: TRANSPORTATION INSECURITY
IN THE PAST 12 MONTHS, HAS LACK OF TRANSPORTATION KEPT YOU FROM MEETINGS, WORK, OR FROM GETTING THINGS NEEDED FOR DAILY LIVING?: NO

## 2021-02-02 SDOH — ECONOMIC STABILITY: FOOD INSECURITY: WITHIN THE PAST 12 MONTHS, THE FOOD YOU BOUGHT JUST DIDN'T LAST AND YOU DIDN'T HAVE MONEY TO GET MORE.: NEVER TRUE

## 2021-02-02 SDOH — ECONOMIC STABILITY: INCOME INSECURITY: HOW HARD IS IT FOR YOU TO PAY FOR THE VERY BASICS LIKE FOOD, HOUSING, MEDICAL CARE, AND HEATING?: NOT HARD AT ALL

## 2021-02-02 ASSESSMENT — PATIENT HEALTH QUESTIONNAIRE - PHQ9
SUM OF ALL RESPONSES TO PHQ QUESTIONS 1-9: 0
2. FEELING DOWN, DEPRESSED OR HOPELESS: 0
1. LITTLE INTEREST OR PLEASURE IN DOING THINGS: 0
SUM OF ALL RESPONSES TO PHQ QUESTIONS 1-9: 0

## 2021-02-02 NOTE — PROGRESS NOTES
Medicare Annual Wellness Visit  Name: Curtis Centeno Date: 2021   MRN: [id number] Sex: Female   Age: 76 y.o. Ethnicity: Non-/Non    : 1945 Race: Shaun Fiore is here for Medicare AWV (here for Medicare AWV)    Screenings for behavioral, psychosocial and functional/safety risks, and cognitive dysfunction are all negative except as indicated below. These results, as well as other patient data from the [address] form, are documented in Flowsheets linked to this Encounter. Allergies   Allergen Reactions    Penicillins      reaction in childhood; pt. does not remember         Prior to Visit Medications    Medication Sig Taking? Authorizing Provider   escitalopram (LEXAPRO) 10 MG tablet Take 10 mg by mouth daily Yes Historical Provider, MD   DULoxetine (CYMBALTA) 30 MG extended release capsule Take 1 capsule by mouth daily Yes Historical Provider, MD   hydrocortisone (ANUSOL-HC) 2.5 % CREA rectal cream Rub in twice a day as needed for itch and burn Yes Osiel Castillo MD   gabapentin (NEURONTIN) 300 MG capsule TAKE ONE CAPSULE BY MOUTH 3 TIMES A DAY Yes Historical Provider, MD   esomeprazole (NEXIUM) 40 MG delayed release capsule TAKE 1 CAPSULE BY MOUTH DAILY Yes Osiel Castillo MD   tiZANidine (ZANAFLEX) 4 MG tablet Take 1 tablet by mouth daily Yes Historical Provider, MD   Cholecalciferol (VITAMIN D3) 5000 UNITS CAPS Take 2 capsules by mouth daily. Yes Historical Provider, MD   pilocarpine (SALAGEN) 5 MG tablet Take 1 tablet by mouth nightly. Yes Historical Provider, MD   traZODone (DESYREL) 50 MG tablet TAKE 1 TABLET BY MOUTH EVERY DAY AT NIGHT Yes Osiel Castillo MD   Biotin 1000 MCG TABS Take 1,000 mg by mouth daily. Yes Historical Provider, MD   B COMPLEX-C PO Take  by mouth daily. Yes Historical Provider, MD   fish oil-omega-3 fatty acids 1000 MG capsule Take 2 g by mouth daily.    Yes Historical Provider, MD   Potassium 99 MG TABS Take 99 mg by mouth daily. Yes Historical Provider, MD   LORazepam (ATIVAN) 1 MG tablet Take 1 tablet by mouth daily as needed. Historical Provider, MD   traMADol (ULTRAM) 50 MG tablet Take 1 tablet by mouth daily as needed. Historical Provider, MD   Multiple Vitamin (MULTIVITAMIN PO) Take  by mouth daily. Historical Provider, MD         Past Medical History:   Diagnosis Date    Fibromyalgia     GERD (gastroesophageal reflux disease)     GERD    Irritable bowel syndrome     Senile osteoporosis        Past Surgical History:   Procedure Laterality Date    COLONOSCOPY  2010    Dr. Silvia Evans  2008    total    JOINT REPLACEMENT Right 12/2016    KNEE ARTHROSCOPY      both knees-meniscal injury and wear    TONSILLECTOMY           Family History   Problem Relation Age of Onset    Breast Cancer Mother     Heart Disease Father     Coronary Art Dis Brother     Diabetes Paternal Grandmother     Coronary Art Dis Brother        CareTeam (Including outside providers/suppliers regularly involved in providing care):   Patient Care Team:  Ja Santana MD as PCP - General (Family Medicine)  Ja Santana MD as PCP - [facility] Empaneled Provider  Nuzhat Lawrence MD (Gastroenterology)  Ethan Mireles (Internal Medicine)  Manas Enriquez (Optometry)  Malka Palma MD (Dermatology)    Wt Readings from Last 3 Encounters:   02/02/21 128 lb (58.1 kg)   01/26/21 130 lb (59 kg)   10/23/19 131 lb (59.4 kg)     Vitals:    02/02/21 1302   BP: 108/70   Site: Left Upper Arm   Position: Sitting   Cuff Size: Medium Adult   Pulse: 82   Temp: 97.9 °F (36.6 °C)   TempSrc: Oral   SpO2: 98%   Weight: 128 lb (58.1 kg)   Height: 5' 6\" (1.676 m)     Body mass index is 20.66 kg/m². Based upon direct observation of the patient, evaluation of cognition reveals recent and remote memory intact. Patient's complete Health Risk Assessment and screening values have been reviewed and are found in Flowsheets.  The following problems were reviewed FRAX score)  09/07/2019    Lipid screen  10/09/2024    Colon cancer screen colonoscopy  10/22/2025    DTaP/Tdap/Td vaccine (3 - Td) 02/10/2026    Flu vaccine  Completed    Shingles Vaccine  Completed    Pneumococcal 65+ years Vaccine  Completed    Hepatitis C screen  Addressed    Hepatitis A vaccine  Aged Out    Hepatitis B vaccine  Aged Out    Hib vaccine  Aged Out    Meningococcal (ACWY) vaccine  Aged Out     Recommendations for Same Day Serves Due: see orders and patient instructions/AVS.  . Recommended screening schedule for the next 5-10 years is provided to the patient in written form: see Patient Instructions/AVS.    I, [address] LPN, 8/5/[birth year]84, performed the documented evaluation under the direct supervision of the attending physician. This encounter was performed under my, Randy Acevedo, direct supervision, 2/2/2021.

## 2021-02-02 NOTE — PATIENT INSTRUCTIONS
diet is one that limits sodium , certain types of fat , and cholesterol . This type of diet is recommended for:   People with any form of cardiovascular disease (eg, coronary heart disease , peripheral vascular disease , previous heart attack , previous stroke )   People with risk factors for cardiovascular disease, such as high blood pressure , high cholesterol , or diabetes   Anyone who wants to lower their risk of developing cardiovascular disease   Sodium    Sodium is a mineral found in many foods. In general, most people consume much more sodium than they need. Diets high in sodium can increase blood pressure and lead to edema (water retention). On a heart-healthy diet, you should consume no more than 2,300 mg (milligrams) of sodium per dayabout the amount in one teaspoon of table salt. The foods highest in sodium include table salt (about 50% sodium), processed foods, convenience foods, and preserved foods. Cholesterol    Cholesterol is a fat-like, waxy substance in your blood. Our bodies make some cholesterol. It is also found in animal products, with the highest amounts in fatty meat, egg yolks, whole milk, cheese, shellfish, and organ meats. On a heart-healthy diet, you should limit your cholesterol intake to less than 200 mg per day. It is normal and important to have some cholesterol in your bloodstream. But too much cholesterol can cause plaque to build up within your arteries, which can eventually lead to a heart attack or stroke. The two types of cholesterol that are most commonly referred to are:   Low-density lipoprotein (LDL) cholesterol  Also known as bad cholesterol, this is the cholesterol that tends to build up along your arteries. Bad cholesterol levels are increased by eating fats that are saturated or hydrogenated. Optimal level of this cholesterol is less than 100. Over 130 starts to get risky for heart disease.    High-density lipoprotein (HDL) cholesterol  Also known as good cholesterol, this type of cholesterol actually carries cholesterol away from your arteries and may, therefore, help lower your risk of having a heart attack. You want this level to be high (ideally greater than 60). It is a risk to have a level less than 40. You can raise this good cholesterol by eating olive oil, canola oil, avocados, or nuts. Exercise raises this level, too. Fat    Fat is calorie dense and packs a lot of calories into a small amount of food. Even though fats should be limited due to their high calorie content, not all fats are bad. In fact, some fats are quite healthful. Fat can be broken down into four main types. The good-for-you fats are:   Monounsaturated fat  found in oils such as olive and canola, avocados, and nuts and natural nut butters; can decrease cholesterol levels, while keeping levels of HDL cholesterol high   Polyunsaturated fat  found in oils such as safflower, sunflower, soybean, corn, and sesame; can decrease total cholesterol and LDL cholesterol   Omega-3 fatty acids  particularly those found in fatty fish (such as salmon, trout, tuna, mackerel, herring, and sardines); can decrease risk of arrhythmias, decrease triglyceride levels, and slightly lower blood pressure   The fats that you want to limit are:   Saturated fat  found in animal products, many fast foods, and a few vegetables; increases total blood cholesterol, including LDL levels   Animal fats that are saturated include: butter, lard, whole-milk dairy products, meat fat, and poultry skin   Vegetable fats that are saturated include: hydrogenated shortening, palm oil, coconut oil, cocoa butter   Hydrogenated or trans fat  found in margarine and vegetable shortening, most shelf stable snack foods, and fried foods; increases LDL and decreases HDL     It is generally recommended that you limit your total fat for the day to less than 30% of your total calories.  If you follow an 1800-calorie heart healthy diet, for week) Tofu Nuts or seeds (unsalted, dry-roasted), low-sodium peanut butter Dried peas, beans, and lentils   Any smoked, cured, salted, or canned meat, fish, or poultry (including gan, chipped beef, cold cuts, hot dogs, sausages, sardines, and anchovies) Poultry skins Breaded and/or fried fish or meats Canned peas, beans, and lentils Salted nuts   Fats and Oils   Olive oil and canola oil Low-sodium, low-fat salad dressings and mayonnaise   Butter, margarine, coconut and palm oils, gan fat   Snacks, Sweets, and Condiments   Low-sodium or unsalted versions of broths, soups, soy sauce, and condiments Pepper, herbs, and spices; vinegar, lemon, or lime juice Low-fat frozen desserts (yogurt, sherbet, fruit bars) Sugar, cocoa powder, honey, syrup, jam, and preserves Low-fat, trans-fat free cookies, cakes, and pies Giovanni and animal crackers, fig bars, terri snaps   High-fat desserts Broth, soups, gravies, and sauces, made from instant mixes or other high-sodium ingredients Salted snack foods Canned olives Meat tenderizers, seasoning salt, and most flavored vinegars   Beverages   Low-sodium carbonated beverages Tea and coffee in moderation Soy milk   Commercially softened water   Suggestions   Make whole grains, fruits, and vegetables the base of your diet. Choose heart-healthy fats such as canola, olive, and flaxseed oil, and foods high in heart-healthy fats, such as nuts, seeds, soybeans, tofu, and fish. Eat fish at least twice per week; the fish highest in omega-3 fatty acids and lowest in mercury include salmon, herring, mackerel, sardines, and canned chunk light tuna. If you eat fish less than twice per week or have high triglycerides, talk to your doctor about taking fish oil supplements. Read food labels.    For products low in fat and cholesterol, look for fat free, low-fat, cholesterol free, saturated fat free, and trans fat freeAlso scan the Nutrition Facts Label, which lists saturated fat, trans fat, and cholesterol amounts. For products low in sodium, look for sodium free, very low sodium, low sodium, no added salt, and unsalted   Skip the salt when cooking or at the table; if food needs more flavor, get creative and try out different herbs and spices. Garlic and onion also add substantial flavor to foods. Trim any visible fat off meat and poultry before cooking, and drain the fat off after euceda. Use cooking methods that require little or no added fat, such as grilling, boiling, baking, poaching, broiling, roasting, steaming, stir-frying, and sauting. Avoid fast food and convenience food. They tend to be high in saturated and trans fat and have a lot of added salt. Talk to a registered dietitian for individualized diet advice. Last Reviewed: March 2011 Jocelynn Zapien MS, MPH, RD   Updated: 3/29/2011   ·     Keep Your Memory Ana Furnish       Many factors can affect your ability to remembera hectic lifestyle, aging, stress, chronic disease, and certain medicines. But, there are steps you can take to sharpen your mind and help preserve your memory. Challenge Your Brain   Regularly challenging your mind may help keeps it in top shape. Good mental exercises include:   Crossword puzzlesUse a dictionary if you need it; you will learn more that way. Brainteasers Try some! Crafts, such as wood working and sewing   Hobbies, such as gardening and building model airplanes   SocializingVisit old friends or join groups to meet new ones. Reading   Learning a new language   Taking a class, whether it be art history or hoa chi   TravelingExperience the food, history, and culture of your destination   Learning to use a computer   Going to museums, the theater, or thought-provoking movies   Changing things in your daily life, such as reversing your pattern in the grocery store or brushing your teeth using your nondominant hand   Use Memory Aids   There is no need to remember every detail on your own.  These memory aids can help:   Calendars and day planners   Electronic organizers to store all sorts of helpful informationThese devices can \"beep\" to remind you of appointments. A book of days to record birthdays, anniversaries, and other occasions that occur on the same date every year   Detailed \"to-do\" lists and strategically placed sticky notes   Quick \"study\" sessionsBefore a gathering, review who will be there so their names will be fresh in your mind. Establish routinesFor example, keep your keys, wallet, and umbrella in the same place all the time or take medicine with your 8:00 AM glass of juice   Live a Healthy Life   Many actions that will keep your body strong will do the same for your mind. For example:   Talk to Your Doctor About Herbs and Supplements    Malnutrition and vitamin deficiencies can impair your mental function. For example, vitamin B12 deficiency can cause a range of symptoms, including confusion. But, what if your nutritional needs are being met? Can herbs and supplements still offer a benefit? Researchers have investigated a range of natural remedies, such as ginkgo , ginseng , and the supplement phosphatidylserine (PS). So far, though, the evidence is inconsistent as to whether these products can improve memory or thinking. If you are interested in taking herbs and supplements, talk to your doctor first because they may interact with other medicines that you are taking. Exercise Regularly    Among the many benefits of regular exercise are increased blood flow to the brain and decreased risk of certain diseases that can interfere with memory function. One study found that even moderate exercise has a beneficial effect. Examples of \"moderate\" exercise include:   Playing 18 holes of golf once a week, without a cart   Playing tennis twice a week   Walking one mile per day   Manage Stress    It can be tough to remember what is important when your mind is cluttered.  Make time for relaxation. Choose activities that calm you down, and make it routine. Manage Chronic Conditions    Side effects of high blood pressure , diabetes, and heart disease can interfere with mental function. Many of the lifestyle steps discussed here can help manage these conditions. Strive to eat a healthy diet, exercise regularly, get stress under control, and follow your doctor's advice for your condition. Minimize Medications    Talk to your doctor about the medicines that you take. Some may be unnecessary. Also, healthy lifestyle habits may lower the need for certain drugs. Last Reviewed: April 2010 Minus MD Maged   Updated: 4/13/2010   ·     [facility] [place] [phone]        As we get older, changes in balance, gait, strength, vision, hearing, and cognition make even the most youthful senior more prone to accidents. Falls are one of the leading health risks for older people. This increased risk of falling is related to:   Aging process (eg, decreased muscle strength, slowed reflexes)   Higher incidence of chronic health problems (eg, arthritis, diabetes) that may limit mobility, agility or sensory awareness   Side effects of medicine (eg, dizziness, blurred vision)especially medicines like prescription pain medicines and drugs used to treat mental health conditions   Depending on the brittleness of your bones, the consequences of a fall can be serious and long lasting. Home Life   Research by the Association of Aging [facility]) shows that some home accidents among older adults can be prevented by making simple lifestyle changes and basic modifications and repairs to the home environment. Here are some lifestyle changes that experts recommend:   Have your hearing and vision checked regularly. Be sure to wear prescription glasses that are right for you. Speak to your doctor or pharmacist about the possible side effects of your medicines. A number of medicines can cause dizziness.    If you have problems with sleep, talk to your doctor. Limit your intake of alcohol. If necessary, use a cane or walker to help maintain your balance. Wear supportive, rubber-soled shoes, even at home. If you live in a region that gets wintry weather, you may want to put special cleats on your shoes to prevent you from slipping on the snow and ice. Exercise regularly to help maintain muscle tone, agility, and balance. Always hold the banister when going up or down stairs. Also, use  bars when getting in or out of the bath or shower, or using the toilet. To avoid dizziness, get up slowly from a lying down position. Sit up first, dangling your legs for a minute or two before rising to a standing position. Overall Home Safety Check   According to the Consumer Product Safety Commision's \"Older Consumer Home Safety Checklist,\" it is important to check for potential hazards in each room. And remember, proper lighting is an essential factor in home safety. If you cannot see clearly, you are more likely to fall. Important questions to ask yourself include:   Are lamp, electric, extension, and telephone cords placed out of the flow of traffic and maintained in good condition? Have frayed cords been replaced? Are all small rugs and runners slip resistant? If not, you can secure them to the floor with a special double-sided carpet tape. Are smoke detectors properly locatedone on every floor of your home and one outside of every sleeping area? Are they in good working order? Are batteries replaced at least once a year? Do you have a well-maintained carbon monoxide detector outside every sleeping are in your home? Does your furniture layout leave plenty of space to maneuver between and around chairs, tables, beds, and sofas? Are hallways, stairs and passages between rooms well lit? Can you reach a lamp without getting out of bed? Are floor surfaces well maintained?  Shag rugs, high-pile carpeting, tile floors, and polished wood floors can be particularly slippery. Stairs should always have handrails and be carpeted or fitted with a non-skid tread. Is your telephone easily reachable. Is the cord safely tucked away? Room by Room   According to the Association of Aging, bathrooms and navi are the two most potentially hazardous rooms in your home. In the Kitchen    Be sure your stove is in proper working order and always make sure burners and the oven are off before you go out or go to sleep. Keep pots on the back burners, turn handles away from the front of the stove, and keep stove clean and free of grease build-up. Kitchen ventilation systems and range exhausts should be working properly. Keep flammable objects such as towels and pot holders away from the cooking area except when in use. Make sure kitchen curtains are tied back. Move cords and appliances away from the sink and hot surfaces. If extension cords are needed, install wiring guides so they do not hang over the sink, range, or working areas. Look for coffee pots, kettles and toaster ovens with automatic shut-offs. Keep a mop handy in the kitchen so you can wipe up spills instantly. You should also have a small fire extinguisher. Arrange your kitchen with frequently used items on lower shelves to avoid the need to stand on a stepstool to reach them. Make sure countertops are well-lit to avoid injuries while cutting and preparing food. In the Bathroom    Use a non-slip mat or decals in the tub and shower, since wet, soapy tile or porcelain surfaces are extremely slippery. Make sure bathroom rugs are non-skid or tape them firmly to the floor. Bathtubs should have at least one, preferably two, grab bars, firmly attached to structural supports in the wall. (Do not use built-in soap holders or glass shower doors as grab bars.)    Tub seats fitted with non-slip material on the legs allow you to wash sitting down.  For people with limited mobility, bathtub transfer benches allow you to slide safely into the tub. Raised toilet seats and toilet safety rails are helpful for those with knee or hip problems. In the [place]    Make sure you use a nightlight and that the area around your bed is clear of potential obstacles. Be careful with electric blankets and never go to sleep with a heating pad, which can cause serious burns even if on a low setting. Use fire-resistant mattress covers and pillows, and NEVER smoke in bed. Keep a phone next to the bed that is programmed to dial 911 at the push of a button. If you have a chronic condition, you may want to sign on with an automatic call-in service. Typically the system includes a small pendant that connects directly to an emergency medical voice-response system. You should also make arrangements to stay in contact with someonefriend, neighbor, family memberon a regular schedule. Fire Prevention   According to the "[employer] (Smoke Alarms for Every) [address] [place] [address], senior citizens are one of the two highest risk groups for death and serious injuries due to residential fires. When cooking, wear short-sleeved items, never a bulky long-sleeved robe. The [facility]'s Safety Checklist for Older Consumers emphasizes the importance of checking basements, garages, workshops and storage areas for fire hazards, such as volatile liquids, piles of old rags or clothing and overloaded circuits. Never smoke in bed or when lying down on a couch or recliner chair. Small portable electric or kerosene heaters are responsible for many home fires and should be used cautiously if at all. If you do use one, be sure to keep them away from flammable materials. In case of fire, make sure you have a pre-established emergency exit plan. Have a professional check your fireplace and other fuel-burning appliances yearly.     Helping Hands   Baby boomers entering the olson years will continue to see the development of new products to help older adults live safely and independently in spite of age-related changes. Making Life More Livable  , by Shanelle Fuentes, lists over 1,000 products for \"living well in the mature years,\" such as bathing and mobility aids, household security devices, ergonomically designed knives and peelers, and faucet valves and knobs for temperature control. Medical supply stores and organizations are good sources of information about products that improve your quality of life and insure your safety. Last Reviewed: November 2009 Lashae Starkey MD   Updated: 3/7/2011     ·   Personalized Preventive Plan for Chidi Cristina - 2/2/2021  Medicare offers a range of preventive health benefits. Some of the tests and screenings are paid in full while other may be subject to a deductible, co-insurance, and/or copay. Some of these benefits include a comprehensive review of your medical history including lifestyle, illnesses that may run in your family, and various assessments and screenings as appropriate. After reviewing your medical record and screening and assessments performed today your provider may have ordered immunizations, labs, imaging, and/or referrals for you. A list of these orders (if applicable) as well as your Preventive Care list are included within your After Visit Summary for your review. Other Preventive Recommendations:    A preventive eye exam performed by an eye specialist is recommended every 1-2 years to screen for glaucoma; cataracts, macular degeneration, and other eye disorders. A preventive dental visit is recommended every 6 months. Try to get at least 150 minutes of exercise per week or 10,000 steps per day on a pedometer . Order or download the FREE \"Exercise & Physical Activity: Your Everyday Guide\" from [employer]. Call 3-662.161.7584 or search [employer] online.   You need 7499-9186 mg of calcium and 5548-2957 IU of vitamin D per day. It is possible to meet your calcium requirement with diet alone, but a vitamin D supplement is usually necessary to meet this goal.  When exposed to the sun, use a sunscreen that protects against both UVA and UVB radiation with an SPF of 30 or greater. Reapply every 2 to 3 hours or after sweating, drying off with a towel, or swimming. Always wear a seat belt when traveling in a car. Always wear a helmet when riding a bicycle or motorcycle.

## 2021-02-16 ENCOUNTER — HOSPITAL ENCOUNTER (OUTPATIENT)
Dept: WOMENS IMAGING | Age: 76
Discharge: HOME | End: 2021-02-18
Payer: MEDICARE

## 2021-02-16 DIAGNOSIS — M81.0 SENILE OSTEOPOROSIS: ICD-10-CM

## 2021-02-16 PROCEDURE — 77080 DXA BONE DENSITY AXIAL: CPT

## 2021-03-02 ENCOUNTER — OFFICE VISIT (OUTPATIENT)
Dept: FAMILY MEDICINE CLINIC | Age: 76
End: 2021-03-02
Payer: MEDICARE

## 2021-03-02 VITALS
TEMPERATURE: 97.7 F | HEIGHT: 66 IN | DIASTOLIC BLOOD PRESSURE: 80 MMHG | HEART RATE: 70 BPM | RESPIRATION RATE: 16 BRPM | BODY MASS INDEX: 20.89 KG/M2 | WEIGHT: 130 LBS | OXYGEN SATURATION: 95 % | SYSTOLIC BLOOD PRESSURE: 110 MMHG

## 2021-03-02 DIAGNOSIS — K62.5 RECTAL BLEEDING: Primary | ICD-10-CM

## 2021-03-02 DIAGNOSIS — M81.0 SENILE OSTEOPOROSIS: ICD-10-CM

## 2021-03-02 DIAGNOSIS — L84 CORN OF FOOT: ICD-10-CM

## 2021-03-02 PROCEDURE — G8427 DOCREV CUR MEDS BY ELIG CLIN: HCPCS | Performed by: FAMILY MEDICINE

## 2021-03-02 PROCEDURE — G8399 PT W/DXA RESULTS DOCUMENT: HCPCS | Performed by: FAMILY MEDICINE

## 2021-03-02 PROCEDURE — 99213 OFFICE O/P EST LOW 20 MIN: CPT | Performed by: FAMILY MEDICINE

## 2021-03-02 PROCEDURE — 1123F ACP DISCUSS/DSCN MKR DOCD: CPT | Performed by: FAMILY MEDICINE

## 2021-03-02 PROCEDURE — 1090F PRES/ABSN URINE INCON ASSESS: CPT | Performed by: FAMILY MEDICINE

## 2021-03-02 PROCEDURE — 4040F PNEUMOC VAC/ADMIN/RCVD: CPT | Performed by: FAMILY MEDICINE

## 2021-03-02 PROCEDURE — 1036F TOBACCO NON-USER: CPT | Performed by: FAMILY MEDICINE

## 2021-03-02 PROCEDURE — G8482 FLU IMMUNIZE ORDER/ADMIN: HCPCS | Performed by: FAMILY MEDICINE

## 2021-03-02 PROCEDURE — G8420 CALC BMI NORM PARAMETERS: HCPCS | Performed by: FAMILY MEDICINE

## 2021-03-02 PROCEDURE — 3017F COLORECTAL CA SCREEN DOC REV: CPT | Performed by: FAMILY MEDICINE

## 2021-03-02 NOTE — PROGRESS NOTES
Patient: Keila Anguiano (: 1945) is a 76 y.o. female,Established patient, here for evaluation of the following chief complaint(s):  Chief Complaint   Patient presents with    Rectal Bleeding     6 week follow up, bleeding has resolved.  Results     Discuss DEXA scan results. Date: 3/2/21    Allergies   Allergen Reactions    Penicillins      reaction in childhood; pt. does not remember       Vitals:    21 1031   BP: 110/80   Site: Right Upper Arm   Position: Sitting   Cuff Size: Small Adult   Pulse: 70   Resp: 16   Temp: 97.7 °F (36.5 °C)   TempSrc: Oral   SpO2: 95%   Weight: 130 lb (59 kg)   Height: 5' 6\" (1.676 m)      Body mass index is 20.98 kg/m². PHQ Scores 2021 2021 10/23/2019 2017 10/13/2015 2014   PHQ2 Score 0 0 0 0 0 0   PHQ9 Score 0 0 0 0 0 0     Interpretation of Total Score Depression Severity: 1-4 = Minimal depression, 5-9 = Mild depression, 10-14 = Moderate depression, 15-19 = Moderately severe depression, 20-27 = Severe depression    HPI    She is following up on her rectal bleeding which she says has resolved. We discussed her bone density and she has quite osteoporotic bones I really recommend she consider going on medication. She is going to discuss this with her rheumatologist.  She also has a sore on her right foot that just developed and she want me to look at it. It is tender there is no discharge no radiation she has had no fevers no injuries    Review of Systems    Constitutional: Negative for fatigue, fever and sweats. HEENT: Negative for eye discharge and vision loss. Negative for ear drainage, hearing loss and nasal drainage. Respiratory: Negative for cough, dyspnea and wheezing. Cardiovascular:  Negative for chest pain, claudication and irregular heartbeat/palpitations. Gastrointestinal: Negative for abdominal pain, nausea, positive for constipation and diarrhea. Genitourinary: Negative for dysuria, patient had a hysterectomy. Metabolic/Endocrine: Negative for cold intolerance, heat intolerance, polydipsia and polyphagia. No unintended weight loss or weight gain. Neuro/Psychiatric: Negative for gait disturbance. Negative for psychiatric symptoms. Dermatologic: Negative for pruritus and rash. Musculoskeletal: positive for bone/joint symptoms. No numbness or tingling. No loss of function. Hematology: Negative for bleeding and easy bruising. Immunology:  Negative for environmental allergies and food allergies. Physical Exam    Patient's medication, allergies, past medical, surgical, social and family histories were reviewed and updated as appropriate. PHYSICAL EXAM     General: Alert oriented pleasant cooperative no acute distress. Lungs: Clear to auscultation without wheezes rhonchi or rales  Heart: Regular rate and rhythm without murmurs rubs or gallops  Extremities: Right foot between her small toe and the next toe she has a corn at the base with some tenderness slight erythema no spreading redness no discharge no fluctuance              Assessment:   Diagnosis Orders   1. Rectal bleeding   resolved   2. Senile osteoporosis   strongly consider medication   3. Corn of foot   patient referred to podiatry she has 1 already         On this date 03/02/21 I have spent 22 minutes reviewing previous notes, test results and face to face with the patient discussing the diagnosis and importance of compliance with the treatment plan.        Plan:  Current Outpatient Medications   Medication Sig Dispense Refill    escitalopram (LEXAPRO) 10 MG tablet Take 10 mg by mouth daily      DULoxetine (CYMBALTA) 30 MG extended release capsule Take 1 capsule by mouth daily      hydrocortisone (ANUSOL-HC) 2.5 % CREA rectal cream Rub in twice a day as needed for itch and burn 1 Tube 3    gabapentin (NEURONTIN) 300 MG capsule TAKE ONE CAPSULE BY MOUTH 3 TIMES A DAY  5    esomeprazole (NEXIUM) 40 MG delayed release capsule TAKE 1 CAPSULE BY MOUTH DAILY 90 capsule 3    tiZANidine (ZANAFLEX) 4 MG tablet Take 1 tablet by mouth daily      Cholecalciferol (VITAMIN D3) 5000 UNITS CAPS Take 2 capsules by mouth daily.  pilocarpine (SALAGEN) 5 MG tablet Take 1 tablet by mouth nightly.  LORazepam (ATIVAN) 1 MG tablet Take 1 tablet by mouth daily as needed.  traMADol (ULTRAM) 50 MG tablet Take 1 tablet by mouth daily as needed.  traZODone (DESYREL) 50 MG tablet TAKE 1 TABLET BY MOUTH EVERY DAY AT NIGHT 30 tablet 8    Multiple Vitamin (MULTIVITAMIN PO) Take  by mouth daily.  Biotin 1000 MCG TABS Take 1,000 mg by mouth daily.  B COMPLEX-C PO Take  by mouth daily.  fish oil-omega-3 fatty acids 1000 MG capsule Take 2 g by mouth daily.  Potassium 99 MG TABS Take 99 mg by mouth daily. No current facility-administered medications for this visit. No orders of the defined types were placed in this encounter. No orders of the defined types were placed in this encounter. Return in about 6 months (around 9/2/2021). An electronic signature was used to authenticate this note.   Dr. Ja Santana      3/2/21  11:23 AM

## 2021-08-24 ENCOUNTER — TELEPHONE (OUTPATIENT)
Dept: FAMILY MEDICINE CLINIC | Age: 76
End: 2021-08-24

## 2021-08-24 NOTE — TELEPHONE ENCOUNTER
Have not seen patient before yet. In order to order and ask more information will need to be seen. Can add her onto my schedule for a telephone appointment if she agrees. (Okay to double book).   Thank you

## 2021-08-24 NOTE — TELEPHONE ENCOUNTER
----- Message from Pjmark Corado sent at 8/24/2021 12:21 PM EDT -----  Subject: Message to Provider    QUESTIONS  Information for Provider? Aleisha Alonso has had both of her vaccinations, however   may have been exposed and has no symptoms but would like to be tested for   Covid. Please contact her with information to do so.  ---------------------------------------------------------------------------  --------------  CALL BACK INFO  What is the best way for the office to contact you? OK to leave message on   voicemail  Preferred Call Back Phone Number? [phone]  ---------------------------------------------------------------------------  --------------  SCRIPT ANSWERS  Relationship to Patient?  Self

## 2021-09-02 ENCOUNTER — OFFICE VISIT (OUTPATIENT)
Dept: FAMILY MEDICINE CLINIC | Age: 76
End: 2021-09-02
Payer: MEDICARE

## 2021-09-02 VITALS
BODY MASS INDEX: 22.08 KG/M2 | SYSTOLIC BLOOD PRESSURE: 120 MMHG | HEART RATE: 86 BPM | TEMPERATURE: 97.5 F | DIASTOLIC BLOOD PRESSURE: 68 MMHG | OXYGEN SATURATION: 99 % | WEIGHT: 137.4 LBS | HEIGHT: 66 IN

## 2021-09-02 DIAGNOSIS — S62.607A CLOSED NONDISPLACED FRACTURE OF PHALANX OF LEFT LITTLE FINGER, UNSPECIFIED PHALANX, INITIAL ENCOUNTER: Primary | ICD-10-CM

## 2021-09-02 DIAGNOSIS — M19.90 OSTEOARTHRITIS, UNSPECIFIED OSTEOARTHRITIS TYPE, UNSPECIFIED SITE: ICD-10-CM

## 2021-09-02 DIAGNOSIS — S69.92XA INJURY OF FINGER OF LEFT HAND, INITIAL ENCOUNTER: Primary | ICD-10-CM

## 2021-09-02 DIAGNOSIS — M79.7 FIBROMYALGIA: ICD-10-CM

## 2021-09-02 DIAGNOSIS — R73.01 IMPAIRED FASTING GLUCOSE: ICD-10-CM

## 2021-09-02 DIAGNOSIS — G47.00 PERSISTENT INSOMNIA: ICD-10-CM

## 2021-09-02 DIAGNOSIS — M81.0 SENILE OSTEOPOROSIS: ICD-10-CM

## 2021-09-02 DIAGNOSIS — G25.81 RESTLESS LEGS SYNDROME (RLS): ICD-10-CM

## 2021-09-02 DIAGNOSIS — F32.5 MAJOR DEPRESSIVE DISORDER, SINGLE EPISODE, IN FULL REMISSION (HCC): ICD-10-CM

## 2021-09-02 PROCEDURE — 99214 OFFICE O/P EST MOD 30 MIN: CPT | Performed by: FAMILY MEDICINE

## 2021-09-02 PROCEDURE — G8420 CALC BMI NORM PARAMETERS: HCPCS | Performed by: FAMILY MEDICINE

## 2021-09-02 PROCEDURE — G8427 DOCREV CUR MEDS BY ELIG CLIN: HCPCS | Performed by: FAMILY MEDICINE

## 2021-09-02 PROCEDURE — 1090F PRES/ABSN URINE INCON ASSESS: CPT | Performed by: FAMILY MEDICINE

## 2021-09-02 PROCEDURE — 4040F PNEUMOC VAC/ADMIN/RCVD: CPT | Performed by: FAMILY MEDICINE

## 2021-09-02 PROCEDURE — G8399 PT W/DXA RESULTS DOCUMENT: HCPCS | Performed by: FAMILY MEDICINE

## 2021-09-02 PROCEDURE — 1123F ACP DISCUSS/DSCN MKR DOCD: CPT | Performed by: FAMILY MEDICINE

## 2021-09-02 PROCEDURE — 1036F TOBACCO NON-USER: CPT | Performed by: FAMILY MEDICINE

## 2021-09-02 PROCEDURE — 3017F COLORECTAL CA SCREEN DOC REV: CPT | Performed by: FAMILY MEDICINE

## 2021-09-02 RX ORDER — PRIMIDONE 50 MG/1
50 TABLET ORAL NIGHTLY
COMMUNITY
Start: 2021-08-03

## 2021-09-02 NOTE — PROGRESS NOTES
Chief Complaint   Patient presents with   [address]     fell this morning and hurt her left finger         HPI: Fernando Jang 76 y.o. female presenting for     Fifth digit injury  Patient fell this morning   Feels she jammed her pinky  Left inger   Swelling bruising   Pain is 5/10  Patinet is not taking any medication for thie pain. Lab Results   Component Value Date    LABA1C 5.4 09/02/2021     No results found for: EAG      Familial tremors   Patient takes primidone for tremors   Unsure if it helps     Fibromyalgia  Patient is takign lexapro   Using it to banllance the cymblata for hte pain   gabapetin 300 mg TID   Take zanaxine     Insomina   Take the trazodone. GERD   Take tne nexium   Stable at this time. Current Outpatient Medications   Medication Sig Dispense Refill    primidone (MYSOLINE) 50 MG tablet Take 50 mg by mouth nightly      escitalopram (LEXAPRO) 10 MG tablet Take 10 mg by mouth daily      DULoxetine (CYMBALTA) 30 MG extended release capsule Take 1 capsule by mouth daily      hydrocortisone (ANUSOL-HC) 2.5 % CREA rectal cream Rub in twice a day as needed for itch and burn 1 Tube 3    gabapentin (NEURONTIN) 300 MG capsule TAKE ONE CAPSULE BY MOUTH 3 TIMES A DAY  5    esomeprazole (NEXIUM) 40 MG delayed release capsule TAKE 1 CAPSULE BY MOUTH DAILY 90 capsule 3    tiZANidine (ZANAFLEX) 4 MG tablet Take 1 tablet by mouth daily      Cholecalciferol (VITAMIN D3) 5000 UNITS CAPS Take 2 capsules by mouth daily.  pilocarpine (SALAGEN) 5 MG tablet Take 1 tablet by mouth nightly.  traMADol (ULTRAM) 50 MG tablet Take 1 tablet by mouth daily as needed.  traZODone (DESYREL) 50 MG tablet TAKE 1 TABLET BY MOUTH EVERY DAY AT NIGHT 30 tablet 8    Multiple Vitamin (MULTIVITAMIN PO) Take  by mouth daily.  Biotin 1000 MCG TABS Take 1,000 mg by mouth daily.  B COMPLEX-C PO Take  by mouth daily.         fish oil-omega-3 fatty acids 1000 MG capsule Take 2 g by mouth daily.  Potassium 99 MG TABS Take 99 mg by mouth daily. No current facility-administered medications for this visit. ROS  CONSTITUTIONAL: The patient denies fevers, chills, sweats and body ache. HEENT: Denies headache, blurry vision, eye pain, tinnitus, vertigo,  sore throat, neck or thyroid masses. RESPIRATORY: Denies cough, sputum, hemoptysis. CARDIAC: Denies chest pain, pressure, palpitations, Denies lower extremity edema. GASTROINTESTINAL: Denies abdominal pain, constipation, diarrhea, bleeding in the stools,   GENITOURINARY: Denies dysuria, hematuria, nocturia or frequency, urinary incontinence. NEUROLOGIC: Denies headaches, dizziness, syncope, weakness  MUSCULOSKELETAL: Admits to. the pain on the fifth digit of her hand. ENDOCRINOLOGY: Denies heat or cold intolerance. HEMATOLOGY: Denies easy bleeding or blood transfusion,anemia  DERMATOLOGY: Denies changes in moles or pigmentation changes. PSYCHIATRY: Denies depression, agitation or anxiety. Past Medical History:   Diagnosis Date    Fibromyalgia     GERD (gastroesophageal reflux disease)     GERD    Irritable bowel syndrome     Senile osteoporosis         Past Surgical History:   Procedure Laterality Date    COLONOSCOPY  2010    Dr. Mauricio German Right     right thumb    HYSTERECTOMY  2008    total    JOINT REPLACEMENT Right 12/2016    KNEE ARTHROSCOPY      both knees-meniscal injury and wear    TONSILLECTOMY          Family History   Problem Relation Age of Onset    Breast Cancer Mother     Heart Disease Father     Coronary Art Dis Brother     Diabetes Paternal Grandmother     Coronary Art Dis Brother         Social History     Socioeconomic History    Marital status:       Spouse name: Not on file    Number of children: Not on file    Years of education: Not on file    Highest education level: Not on file   Occupational History    Not on file   Tobacco Use    Smoking status: Never Smoker    Smokeless tobacco: Never Used   Vaping Use    Vaping Use: Never used   Substance and Sexual Activity    Alcohol use: Yes     Comment: occasional    Drug use: No    Sexual activity: Not Currently   Other Topics Concern    Not on file   Social History Narrative    Not on file     Social Determinants of Health     Financial Resource Strain: Low Risk     Difficulty of Paying Living Expenses: Not hard at all   Food Insecurity: No Food Insecurity    Worried About Running Out of Food in the Last Year: Never true    Kishore of Food in the Last Year: Never true   Transportation Needs: No Transportation Needs    Lack of Transportation (Medical): No    Lack of Transportation (Non-Medical):  No   Physical Activity:     Days of Exercise per Week:     Minutes of Exercise per Session:    Stress:     Feeling of Stress :    Social Connections:     Frequency of Communication with Friends and Family:     Frequency of Social Gatherings with Friends and Family:     Attends Holiness Services:     Active Member of Clubs or Organizations:     Attends Club or Organization Meetings:     Marital Status:    Intimate Partner Violence:     Fear of Current or Ex-Partner:     Emotionally Abused:     Physically Abused:     Sexually Abused:         /68 (Site: Right Upper Arm, Position: Sitting, Cuff Size: Medium Adult)   Pulse 86   Temp 97.5 °F (36.4 °C) (Temporal)   Ht 5' 6\" (1.676 m)   Wt 137 lb 6.4 oz (62.3 kg)   SpO2 99%   Breastfeeding No   BMI 22.18 kg/m²        Physical Exam:    General appearance - alert, well appearing, and in no distress  Mental Status - alert, oriented to person, place, and time  Eyes - pupils equal and reactive, extraocular eye movements intact   Ears - bilateral TM's and external ear canals normal   Nose - normal and patent, no erythema, discharge or polyps   Sinuses - Normal paranasal sinuses without tenderness   Throat - mucous membranes moist, pharynx normal without lesions   Neck - supple, no significant adenopathy   Thyroid - thyroid is normal in size without nodules or tenderness    Chest - clear to auscultation, no wheezes, rales or rhonchi, symmetric air entry   Heart - normal rate, regular rhythm, normal S1, S2, no murmurs, rubs, clicks or gallops  Abdomen - soft, nontender, nondistended, no masses or organomegaly   Back exam - full range of motion, no tenderness, palpable spasm or pain on motion   Neurological - alert, oriented, normal speech, no focal findings or movement disorder noted   Musculoskeletal - no joint tenderness, deformity or swelling   Extremities -fifth digit of the left hand there is swelling and tenderness to palpation. No signs of discharge or bleeding. No signs of erythema. Skin - normal coloration and turgor, no rashes, no suspicious skin lesions noted      Labs   TSH   Date Value Ref Range Status   10/09/2019 2.670 0.440 - 3.860 uIU/mL Final     No results found for: TSH    Lab Results   Component Value Date     02/01/2021    K 4.4 02/01/2021     02/01/2021    CO2 29 02/01/2021    BUN 20 02/01/2021    CREATININE 0.59 02/01/2021    GLUCOSE 84 02/01/2021    CALCIUM 9.3 02/01/2021    PROT 6.3 02/01/2021    LABALBU 4.2 02/01/2021    BILITOT 0.5 02/01/2021    ALKPHOS 60 02/01/2021    AST 22 02/01/2021    ALT 15 02/01/2021    LABGLOM >60.0 02/01/2021    GFRAA >60.0 02/01/2021    GLOB 2.1 (L) 02/01/2021       Lab Results   Component Value Date    WBC 4.1 (L) 02/01/2021    HGB 12.0 02/01/2021    HCT 35.3 (L) 02/01/2021    MCV 94.4 02/01/2021     02/01/2021     Lab Results   Component Value Date    LABA1C 5.4 09/02/2021     No results found for: EAG      Xray left hand   FINDINGS:   A lucency is seen in the distal aspect of the middle phalanx of the fifth digit. It extends intra-articular. No displacement is seen.  Degenerative changes are visualized and also osteopenia.           Impression   Nondisplaced fracture is seen in the middle phalanx of the fifth digit. A/P: William Stevenson 76 y.o. female presenting for     1. Injury of finger of left hand, initial encounter  Reviewed x-rays with patient. There is an area of lucency on the image. Likely fracture. Will wait for official results from the radiologist.  Patient's finger was put in a splint and patient given recommendations for pain. - XR FINGER LEFT (MIN 2 VIEWS); Future    2. Major depressive disorder, single episode, in full remission ([facility] [address]      3. Impaired fasting glucose    - Hemoglobin A1C; Future    4. Senile osteoporosis      5. Fibromyalgia  Follow-up with pain management. 6. Osteoarthritis, unspecified osteoarthritis type, unspecified site      7. Restless legs syndrome (RLS)      8. Persistent insomnia            Please note, this report has been partially produced using speech recognition software  and may cause  and /or contain errors related to that system including grammar, punctuation and spelling as well as words and phrases that may seem inappropriate. If there are questions or concerns please feel free to contact me to clarify.

## 2022-02-03 ENCOUNTER — VIRTUAL VISIT (OUTPATIENT)
Dept: FAMILY MEDICINE CLINIC | Age: 77
End: 2022-02-03
Payer: MEDICARE

## 2022-02-03 DIAGNOSIS — L30.9 DERMATITIS: Primary | ICD-10-CM

## 2022-02-03 PROCEDURE — 99442 PR PHYS/QHP TELEPHONE EVALUATION 11-20 MIN: CPT | Performed by: FAMILY MEDICINE

## 2022-02-03 ASSESSMENT — ENCOUNTER SYMPTOMS
BACK PAIN: 0
APNEA: 0
CHOKING: 0
EYE PAIN: 0

## 2022-02-03 NOTE — PROGRESS NOTES
(ANUSOL-HC) 2.5 % CREA rectal cream Rub in twice a day as needed for itch and burn  Seth Gamble MD   gabapentin (NEURONTIN) 300 MG capsule TAKE ONE CAPSULE BY MOUTH 3 TIMES A DAY  Historical Provider, MD   esomeprazole (NEXIUM) 40 MG delayed release capsule TAKE 1 CAPSULE BY MOUTH DAILY  Seth Gamble MD   tiZANidine (ZANAFLEX) 4 MG tablet Take 1 tablet by mouth daily  Historical Provider, MD   Cholecalciferol (VITAMIN D3) 5000 UNITS CAPS Take 2 capsules by mouth daily. Historical Provider, MD   pilocarpine (SALAGEN) 5 MG tablet Take 1 tablet by mouth nightly. Historical Provider, MD   traMADol (ULTRAM) 50 MG tablet Take 1 tablet by mouth daily as needed. Historical Provider, MD   traZODone (DESYREL) 50 MG tablet TAKE 1 TABLET BY MOUTH EVERY DAY AT NIGHT  Seth Gamble MD   Multiple Vitamin (MULTIVITAMIN PO) Take  by mouth daily. Historical Provider, MD   Biotin 1000 MCG TABS Take 1,000 mg by mouth daily. Historical Provider, MD   B COMPLEX-C PO Take  by mouth daily. Historical Provider, MD   fish oil-omega-3 fatty acids 1000 MG capsule Take 2 g by mouth daily. Historical Provider, MD   Potassium 99 MG TABS Take 99 mg by mouth daily.     Historical Provider, MD       Social History     Tobacco Use    Smoking status: Never Smoker    Smokeless tobacco: Never Used   Vaping Use    Vaping Use: Never used   Substance Use Topics    Alcohol use: Yes     Comment: occasional    Drug use: No        Allergies   Allergen Reactions    Penicillins      reaction in childhood; pt. does not remember   ,   Past Medical History:   Diagnosis Date    Fibromyalgia     GERD (gastroesophageal reflux disease)     GERD    Irritable bowel syndrome     Senile osteoporosis    ,   Past Surgical History:   Procedure Laterality Date    COLONOSCOPY  2010    Dr. Sosa Sites Right     right thumb    HYSTERECTOMY  2008    total    JOINT REPLACEMENT Right 12/2016    KNEE ARTHROSCOPY      both knees-meniscal injury and wear    TONSILLECTOMY     ,   Social History     Tobacco Use    Smoking status: Never Smoker    Smokeless tobacco: Never Used   Vaping Use    Vaping Use: Never used   Substance Use Topics    Alcohol use: Yes     Comment: occasional    Drug use: No   ,   Family History   Problem Relation Age of Onset    Breast Cancer Mother     Heart Disease Father     Coronary Art Dis Brother     Diabetes Paternal Grandmother     Coronary Art Dis Brother    ,   Immunization History   Administered Date(s) Administered    COVID-19, Pfizer Purple top, DILUTE for use, 12+ yrs, 30mcg/0.3mL dose 01/29/2021, 02/20/2021    DTaP 02/03/2016    DTaP vaccine 02/03/2016    Hepatitis A/Hepatitis B (Twinrix) 07/01/2002, 08/02/2002, 01/03/2003    Influenza Vaccine, unspecified formulation 10/13/2015, 09/20/2016    Influenza Virus Vaccine 11/10/2012, 10/01/2014, 09/13/2016    Influenza, High Dose (Fluzone 65 yrs and older) 10/13/2015, 09/20/2016, 10/02/2017, 09/12/2018, 09/07/2019, 10/01/2020    Influenza, Triv, inactivated, subunit, adjuvanted, IM (Fluad 65 yrs and older) 09/16/2019    Pneumococcal Conjugate 13-valent ([id number]) 12/04/2017    Pneumococcal Polysaccharide ([id number]) 12/03/2015    Polio Virus Vaccine 07/01/2002    Tdap (Boostrix, Adacel) 02/10/2016    Tetanus Toxoid, absorbed 08/20/2000    Typhoid Vi capsular polysaccharide (Typhim VI) 07/01/2002, 02/10/2016    Zoster Live (Zostavax) 03/08/2016    Zoster Recombinant (Shingrix) 09/12/2018, 12/28/2018   ,   Health Maintenance   Topic Date Due    Depression Monitoring  Never done    COVID-19 Vaccine (3 - Booster for Bevii series) 07/20/2021    Flu vaccine (1) 09/01/2021    Annual Wellness Visit (AWV)  02/03/2022    DEXA (modify frequency per FRAX score)  02/16/2022    DTaP/Tdap/Td vaccine (3 - Td or Tdap) 02/10/2026    Shingles Vaccine  Completed    Pneumococcal 65+ years Vaccine  Completed    Hepatitis C screen  Addressed    Hepatitis A vaccine  Aged Out    Hepatitis B vaccine  Aged Out    Hib vaccine  Aged Out    Meningococcal (ACWY) vaccine  Aged Out       PHYSICAL EXAMINATION:  [ INSTRUCTIONS:  \"[x]\" Indicates a positive item  \"[]\" Indicates a negative item  -- DELETE ALL ITEMS NOT EXAMINED]  [x] Alert  [x] Oriented to person/place/time    [x] No apparent distress  [] Toxic appearing    [] Face flushed appearing [] Sclera clear  [] Lips are cyanotic      [x] Breathing appears normal  [] Appears tachypneic      [] Rash on visible skin    [] Cranial Nerves II-XII grossly intact    [] Motor grossly intact in visible upper extremities    [] Motor grossly intact in visible lower extremities    [x] Normal Mood  [] Anxious appearing    [] Depressed appearing  [] Confused appearing      [] Poor short term memory  [] Poor long term memory    [] OTHER:      Due to this being a TeleHealth encounter, evaluation of the following organ systems is limited: Vitals/Constitutional/EENT/Resp/CV/GI//MS/Neuro/Skin/Heme-Lymph-Imm. ASSESSMENT/PLAN:  1. Dermatitis  Followed by derm  - triamcinolone (KENALOG) 0.1 % ointment; Apply BID On the affected area for up to 7 days. Use a small amount. Dispense: 80 g; Refill: 1      No follow-ups on file. An  electronic signature was used to authenticate this note. --Colin Christine MD on 2/3/2022 at 9:03 AM        Pursuant to the emergency declaration under the [facility], [facility] waiver authority and the [employer] Act, this Virtual  Visit was conducted, with patient's consent, to reduce the patient's risk of exposure to COVID-19 and provide continuity of care for an established patient. Services were provided through a video synchronous discussion virtually to substitute for in-person clinic visit. Rosa Weeks is a 68 y.o. female evaluated via telephone on 2/3/2022.       Consent:  She and/or health care decision maker is aware that that she may receive a bill for this telephone service, which includes applicable co-pays, depending on her insurance coverage, and has provided verbal consent to proceed. Documentation:  I communicated with the patient and/or health care decision maker about  Dermatitis . Details of this discussion including any medical advice provided: yes       I affirm this is a Patient Initiated Episode with a Patient who has not had a related appointment within my department in the past 7 days or scheduled within the next 24 hours. Patient identification was verified at the start of the visit: Yes    Total Time: minutes: 11 minutes    Rosa Weeks was evaluated through a synchronous (real-time) audio encounter. The patient was located at home in a state where the provider was licensed to provide care.     Note: not billable if this call serves to triage the patient into an appointment for the relevant concern      Colin Christine MD

## 2022-03-17 ENCOUNTER — OFFICE VISIT (OUTPATIENT)
Dept: FAMILY MEDICINE CLINIC | Age: 77
End: 2022-03-17
Payer: MEDICARE

## 2022-03-17 VITALS
WEIGHT: 126 LBS | OXYGEN SATURATION: 97 % | DIASTOLIC BLOOD PRESSURE: 68 MMHG | SYSTOLIC BLOOD PRESSURE: 112 MMHG | BODY MASS INDEX: 20.25 KG/M2 | HEIGHT: 66 IN | TEMPERATURE: 97.4 F | HEART RATE: 79 BPM

## 2022-03-17 DIAGNOSIS — S62.607A CLOSED NONDISPLACED FRACTURE OF PHALANX OF LEFT LITTLE FINGER, UNSPECIFIED PHALANX, INITIAL ENCOUNTER: ICD-10-CM

## 2022-03-17 DIAGNOSIS — L30.9 DERMATITIS: Primary | ICD-10-CM

## 2022-03-17 DIAGNOSIS — Z91.81 AT HIGH RISK FOR FALLS: ICD-10-CM

## 2022-03-17 DIAGNOSIS — M19.90 OSTEOARTHRITIS, UNSPECIFIED OSTEOARTHRITIS TYPE, UNSPECIFIED SITE: ICD-10-CM

## 2022-03-17 DIAGNOSIS — G25.81 RESTLESS LEGS SYNDROME (RLS): ICD-10-CM

## 2022-03-17 DIAGNOSIS — M35.01 KERATOCONJUNCTIVITIS SICCA, IN SJOGREN'S SYNDROME (HCC): ICD-10-CM

## 2022-03-17 DIAGNOSIS — M79.7 FIBROMYALGIA: ICD-10-CM

## 2022-03-17 DIAGNOSIS — F32.5 MAJOR DEPRESSIVE DISORDER, SINGLE EPISODE, IN FULL REMISSION (HCC): ICD-10-CM

## 2022-03-17 PROCEDURE — 1036F TOBACCO NON-USER: CPT | Performed by: FAMILY MEDICINE

## 2022-03-17 PROCEDURE — G8420 CALC BMI NORM PARAMETERS: HCPCS | Performed by: FAMILY MEDICINE

## 2022-03-17 PROCEDURE — 4040F PNEUMOC VAC/ADMIN/RCVD: CPT | Performed by: FAMILY MEDICINE

## 2022-03-17 PROCEDURE — G8399 PT W/DXA RESULTS DOCUMENT: HCPCS | Performed by: FAMILY MEDICINE

## 2022-03-17 PROCEDURE — 99213 OFFICE O/P EST LOW 20 MIN: CPT | Performed by: FAMILY MEDICINE

## 2022-03-17 PROCEDURE — G8427 DOCREV CUR MEDS BY ELIG CLIN: HCPCS | Performed by: FAMILY MEDICINE

## 2022-03-17 PROCEDURE — G8484 FLU IMMUNIZE NO ADMIN: HCPCS | Performed by: FAMILY MEDICINE

## 2022-03-17 PROCEDURE — 3288F FALL RISK ASSESSMENT DOCD: CPT | Performed by: FAMILY MEDICINE

## 2022-03-17 PROCEDURE — 1123F ACP DISCUSS/DSCN MKR DOCD: CPT | Performed by: FAMILY MEDICINE

## 2022-03-17 PROCEDURE — 1090F PRES/ABSN URINE INCON ASSESS: CPT | Performed by: FAMILY MEDICINE

## 2022-03-17 SDOH — ECONOMIC STABILITY: FOOD INSECURITY: WITHIN THE PAST 12 MONTHS, THE FOOD YOU BOUGHT JUST DIDN'T LAST AND YOU DIDN'T HAVE MONEY TO GET MORE.: NEVER TRUE

## 2022-03-17 SDOH — ECONOMIC STABILITY: FOOD INSECURITY: WITHIN THE PAST 12 MONTHS, YOU WORRIED THAT YOUR FOOD WOULD RUN OUT BEFORE YOU GOT MONEY TO BUY MORE.: NEVER TRUE

## 2022-03-17 ASSESSMENT — PATIENT HEALTH QUESTIONNAIRE - PHQ9
3. TROUBLE FALLING OR STAYING ASLEEP: 0
SUM OF ALL RESPONSES TO PHQ QUESTIONS 1-9: 0
1. LITTLE INTEREST OR PLEASURE IN DOING THINGS: 0
1. LITTLE INTEREST OR PLEASURE IN DOING THINGS: 0
9. THOUGHTS THAT YOU WOULD BE BETTER OFF DEAD, OR OF HURTING YOURSELF: 0
2. FEELING DOWN, DEPRESSED OR HOPELESS: 0
SUM OF ALL RESPONSES TO PHQ QUESTIONS 1-9: 0
SUM OF ALL RESPONSES TO PHQ9 QUESTIONS 1 & 2: 0
1. LITTLE INTEREST OR PLEASURE IN DOING THINGS: 0
SUM OF ALL RESPONSES TO PHQ QUESTIONS 1-9: 0
7. TROUBLE CONCENTRATING ON THINGS, SUCH AS READING THE NEWSPAPER OR WATCHING TELEVISION: 0
SUM OF ALL RESPONSES TO PHQ QUESTIONS 1-9: 0
10. IF YOU CHECKED OFF ANY PROBLEMS, HOW DIFFICULT HAVE THESE PROBLEMS MADE IT FOR YOU TO DO YOUR WORK, TAKE CARE OF THINGS AT HOME, OR GET ALONG WITH OTHER PEOPLE: 0
8. MOVING OR SPEAKING SO SLOWLY THAT OTHER PEOPLE COULD HAVE NOTICED. OR THE OPPOSITE, BEING SO FIGETY OR RESTLESS THAT YOU HAVE BEEN MOVING AROUND A LOT MORE THAN USUAL: 0
SUM OF ALL RESPONSES TO PHQ9 QUESTIONS 1 & 2: 0
4. FEELING TIRED OR HAVING LITTLE ENERGY: 0
SUM OF ALL RESPONSES TO PHQ9 QUESTIONS 1 & 2: 0
SUM OF ALL RESPONSES TO PHQ QUESTIONS 1-9: 0
SUM OF ALL RESPONSES TO PHQ QUESTIONS 1-9: 0
6. FEELING BAD ABOUT YOURSELF - OR THAT YOU ARE A FAILURE OR HAVE LET YOURSELF OR YOUR FAMILY DOWN: 0
2. FEELING DOWN, DEPRESSED OR HOPELESS: 0
SUM OF ALL RESPONSES TO PHQ QUESTIONS 1-9: 0
SUM OF ALL RESPONSES TO PHQ QUESTIONS 1-9: 0
5. POOR APPETITE OR OVEREATING: 0
2. FEELING DOWN, DEPRESSED OR HOPELESS: 0

## 2022-03-17 ASSESSMENT — SOCIAL DETERMINANTS OF HEALTH (SDOH): HOW HARD IS IT FOR YOU TO PAY FOR THE VERY BASICS LIKE FOOD, HOUSING, MEDICAL CARE, AND HEATING?: NOT HARD AT ALL

## 2022-03-17 ASSESSMENT — LIFESTYLE VARIABLES: HOW MANY STANDARD DRINKS CONTAINING ALCOHOL DO YOU HAVE ON A TYPICAL DAY: 3 OR 4

## 2022-03-17 NOTE — PROGRESS NOTES
Chief Complaint   Patient presents with    6 Month Follow-Up     Denies issues/conerns. HPI: Jenny Spine 68 y.o. female presenting for     Fifth digit injury  Patient fell this morning   Feels she jammed her pinky  Left inger   Swelling bruising   Pain is 5/10  Patinet is not taking any medication for thie pain. Follow  Patient reports he is doing well. No issues or concerns at this time. Did see Ortho and was placed in a splint. Lab Results   Component Value Date    LABA1C 5.4 09/02/2021     No results found for: EAG      Familial tremors   Patient takes primidone for tremors   Unsure if it helps     Follow-up   was seen by neurology. Was placed on the medication but patient is unsure what it was. Stopped the medication due to unwanted side effects (sedative properties). Fibromyalgia  Patient is takign lexapro   Using it to banllance the cymblata for hte pain   gabapetin 300 mg TID   Take zanaxine     Insomina   Take the trazodone. GERD   Take tne nexium   Stable at this time. Current Outpatient Medications   Medication Sig Dispense Refill    triamcinolone (KENALOG) 0.1 % ointment Apply BID On the affected area for up to 7 days. Use a small amount. 80 g 1    primidone (MYSOLINE) 50 MG tablet Take 50 mg by mouth nightly      escitalopram (LEXAPRO) 10 MG tablet Take 10 mg by mouth daily      DULoxetine (CYMBALTA) 30 MG extended release capsule Take 1 capsule by mouth daily      hydrocortisone (ANUSOL-HC) 2.5 % CREA rectal cream Rub in twice a day as needed for itch and burn 1 Tube 3    gabapentin (NEURONTIN) 300 MG capsule TAKE ONE CAPSULE BY MOUTH 3 TIMES A DAY  5    esomeprazole (NEXIUM) 40 MG delayed release capsule TAKE 1 CAPSULE BY MOUTH DAILY 90 capsule 3    tiZANidine (ZANAFLEX) 4 MG tablet Take 1 tablet by mouth daily      Cholecalciferol (VITAMIN D3) 5000 UNITS CAPS Take 2 capsules by mouth daily.       pilocarpine (SALAGEN) 5 MG tablet Take 1 tablet by mouth nightly.  traMADol (ULTRAM) 50 MG tablet Take 1 tablet by mouth daily as needed.  traZODone (DESYREL) 50 MG tablet TAKE 1 TABLET BY MOUTH EVERY DAY AT NIGHT 30 tablet 8    Multiple Vitamin (MULTIVITAMIN PO) Take  by mouth daily.  Biotin 1000 MCG TABS Take 1,000 mg by mouth daily.  B COMPLEX-C PO Take  by mouth daily.  fish oil-omega-3 fatty acids 1000 MG capsule Take 2 g by mouth daily.  Potassium 99 MG TABS Take 99 mg by mouth daily. No current facility-administered medications for this visit. ROS  CONSTITUTIONAL: The patient denies fevers, chills, sweats and body ache. HEENT: Denies headache, blurry vision, eye pain, tinnitus, vertigo,  sore throat, neck or thyroid masses. RESPIRATORY: Denies cough, sputum, hemoptysis. CARDIAC: Denies chest pain, pressure, palpitations, Denies lower extremity edema. GASTROINTESTINAL: Denies abdominal pain, constipation, diarrhea, bleeding in the stools,   GENITOURINARY: Denies dysuria, hematuria, nocturia or frequency, urinary incontinence. NEUROLOGIC: Denies headaches, dizziness, syncope, weakness  MUSCULOSKELETAL: stable. ENDOCRINOLOGY: Denies heat or cold intolerance. HEMATOLOGY: Denies easy bleeding or blood transfusion,anemia  DERMATOLOGY: rash under the bra line. PSYCHIATRY: Denies depression, agitation or anxiety.     Past Medical History:   Diagnosis Date    Fibromyalgia     GERD (gastroesophageal reflux disease)     GERD    Irritable bowel syndrome     Senile osteoporosis         Past Surgical History:   Procedure Laterality Date    COLONOSCOPY  2010    Dr. Yeison Reece Right     right thumb    HYSTERECTOMY  2008    total    JOINT REPLACEMENT Right 12/2016    KNEE ARTHROSCOPY      both knees-meniscal injury and wear    TONSILLECTOMY          Family History   Problem Relation Age of Onset    Breast Cancer Mother     Heart Disease Father     Coronary Art Dis Brother     Diabetes /68   Pulse 79   Temp 97.4 °F (36.3 °C) (Temporal)   Ht 5' 6\" (1.676 m)   Wt 126 lb (57.2 kg)   SpO2 97%   BMI 20.34 kg/m²        Physical Exam:    General appearance - alert, well appearing, and in no distress  Mental Status - alert, oriented to person, place, and time  Eyes - pupils equal and reactive, extraocular eye movements intact   Ears - bilateral TM's and external ear canals normal   Nose - normal and patent, no erythema, discharge or polyps   Sinuses - Normal paranasal sinuses without tenderness   Throat - mucous membranes moist, pharynx normal without lesions   Neck - supple, no significant adenopathy   Thyroid - thyroid is normal in size without nodules or tenderness    Chest - clear to auscultation, no wheezes, rales or rhonchi, symmetric air entry   Heart - normal rate, regular rhythm, normal S1, S2, no murmurs, rubs, clicks or gallops  Abdomen - soft, nontender, nondistended, no masses or organomegaly   Back exam - full range of motion, no tenderness, palpable spasm or pain on motion   Neurological - alert, oriented, normal speech, no focal findings or movement disorder noted   Musculoskeletal - no joint tenderness, deformity or swelling   Extremities -negative  Skin - erythematous papular rash. Nontender in nature.        Labs   TSH   Date Value Ref Range Status   10/09/2019 2.670 0.440 - 3.860 uIU/mL Final     No results found for: TSH    Lab Results   Component Value Date     02/01/2021    K 4.4 02/01/2021     02/01/2021    CO2 29 02/01/2021    BUN 20 02/01/2021    CREATININE 0.59 02/01/2021    GLUCOSE 84 02/01/2021    CALCIUM 9.3 02/01/2021    PROT 6.3 02/01/2021    LABALBU 4.2 02/01/2021    BILITOT 0.5 02/01/2021    ALKPHOS 60 02/01/2021    AST 22 02/01/2021    ALT 15 02/01/2021    LABGLOM >60.0 02/01/2021    GFRAA >60.0 02/01/2021    GLOB 2.1 (L) 02/01/2021       Lab Results   Component Value Date    WBC 4.1 (L) 02/01/2021    HGB 12.0 02/01/2021    HCT 35.3 (L) 02/01/2021    MCV 94.4 02/01/2021     02/01/2021     Lab Results   Component Value Date    LABA1C 5.4 09/02/2021     No results found for: EAG      Xray left hand   FINDINGS:   A lucency is seen in the distal aspect of the middle phalanx of the fifth digit. It extends intra-articular. No displacement is seen. Degenerative changes are visualized and also osteopenia.           Impression   Nondisplaced fracture is seen in the middle phalanx of the fifth digit. A/P: Margaret Buddle 68 y.o. female presenting for     1. Keratoconjunctivitis sicca, in Sjogren's syndrome ([facility] [address]      2. Major depressive disorder, single episode, in full remission ([facility] [address]  Stable. 3. Dermatitis  Wants to hold off on changing the steroid cream  Has a dermatologist appoitnment coming up. 4. Closed nondisplaced fracture of phalanx of left little finger, unspecified phalanx, initial encounter  Healed     5. Osteoarthritis, unspecified osteoarthritis type, unspecified site  F/u with rheumatologsit    6. Restless legs syndrome (RLS)      7. Fibromyalgia  F/u with rheum             Please note, this report has been partially produced using speech recognition software  and may cause  and /or contain errors related to that system including grammar, punctuation and spelling as well as words and phrases that may seem inappropriate. If there are questions or concerns please feel free to contact me to clarify. On the basis of positive falls risk screening, assessment and plan is as follows: home safety tips provided.

## 2022-03-18 ENCOUNTER — OFFICE VISIT (OUTPATIENT)
Dept: FAMILY MEDICINE CLINIC | Age: 77
End: 2022-03-18
Payer: MEDICARE

## 2022-03-18 DIAGNOSIS — Z00.00 MEDICARE ANNUAL WELLNESS VISIT, SUBSEQUENT: Primary | ICD-10-CM

## 2022-03-18 PROCEDURE — 1123F ACP DISCUSS/DSCN MKR DOCD: CPT | Performed by: FAMILY MEDICINE

## 2022-03-18 PROCEDURE — 4040F PNEUMOC VAC/ADMIN/RCVD: CPT | Performed by: FAMILY MEDICINE

## 2022-03-18 PROCEDURE — G0439 PPPS, SUBSEQ VISIT: HCPCS | Performed by: FAMILY MEDICINE

## 2022-03-18 PROCEDURE — G8484 FLU IMMUNIZE NO ADMIN: HCPCS | Performed by: FAMILY MEDICINE

## 2022-03-18 NOTE — PROGRESS NOTES
hydrocortisone (ANUSOL-HC) 2.5 % CREA rectal cream Rub in twice a day as needed for itch and burn  Calderon Stewart MD   gabapentin (NEURONTIN) 300 MG capsule TAKE ONE CAPSULE BY MOUTH 3 TIMES A DAY  Historical Provider, MD   esomeprazole (NEXIUM) 40 MG delayed release capsule TAKE 1 CAPSULE BY MOUTH DAILY  Calderon Stewart MD   tiZANidine (ZANAFLEX) 4 MG tablet Take 1 tablet by mouth daily  Historical Provider, MD   Cholecalciferol (VITAMIN D3) 5000 UNITS CAPS Take 2 capsules by mouth daily. Historical Provider, MD   pilocarpine (SALAGEN) 5 MG tablet Take 1 tablet by mouth nightly. Historical Provider, MD   traMADol (ULTRAM) 50 MG tablet Take 1 tablet by mouth daily as needed. Historical Provider, MD   traZODone (DESYREL) 50 MG tablet TAKE 1 TABLET BY MOUTH EVERY DAY AT NIGHT  Calderon Stewart MD   Multiple Vitamin (MULTIVITAMIN PO) Take  by mouth daily. Historical Provider, MD   Biotin 1000 MCG TABS Take 1,000 mg by mouth daily. Historical Provider, MD   B COMPLEX-C PO Take  by mouth daily. Historical Provider, MD   fish oil-omega-3 fatty acids 1000 MG capsule Take 2 g by mouth daily. Historical Provider, MD   Potassium 99 MG TABS Take 99 mg by mouth daily.     Historical Provider, MD Live (Including outside providers/suppliers regularly involved in providing care):   Patient Care Team:  Trini Malhotra MD as PCP - General (Family Medicine)  Trini Malhotra MD as PCP - [facility] Empaneled Provider  Margo Smith MD (Gastroenterology)  Connor Rios MD (Internal Medicine)  Candy Gonzáles (Optometry)  Gunner Almendarez MD (Dermatology)    Reviewed and updated this visit:

## 2022-03-18 NOTE — PATIENT INSTRUCTIONS
Personalized Preventive Plan for Candis Hernández - 3/18/2022  Medicare offers a range of preventive health benefits. Some of the tests and screenings are paid in full while other may be subject to a deductible, co-insurance, and/or copay. Some of these benefits include a comprehensive review of your medical history including lifestyle, illnesses that may run in your family, and various assessments and screenings as appropriate. After reviewing your medical record and screening and assessments performed today your provider may have ordered immunizations, labs, imaging, and/or referrals for you. A list of these orders (if applicable) as well as your Preventive Care list are included within your After Visit Summary for your review. Other Preventive Recommendations:    · A preventive eye exam performed by an eye specialist is recommended every 1-2 years to screen for glaucoma; cataracts, macular degeneration, and other eye disorders. · A preventive dental visit is recommended every 6 months. · Try to get at least 150 minutes of exercise per week or 10,000 steps per day on a pedometer . · Order or download the FREE \"Exercise & Physical Activity: Your Everyday Guide\" from [employer]. Call 4-388.862.7764 or search [employer] online. · You need 4233-8818 mg of calcium and 9443-3379 IU of vitamin D per day. It is possible to meet your calcium requirement with diet alone, but a vitamin D supplement is usually necessary to meet this goal.  · When exposed to the sun, use a sunscreen that protects against both UVA and UVB radiation with an SPF of 30 or greater. Reapply every 2 to 3 hours or after sweating, drying off with a towel, or swimming. · Always wear a seat belt when traveling in a car. Always wear a helmet when riding a bicycle or motorcycle.

## 2022-12-06 ENCOUNTER — OFFICE VISIT (OUTPATIENT)
Dept: FAMILY MEDICINE CLINIC | Age: 77
End: 2022-12-06
Payer: MEDICARE

## 2022-12-06 VITALS
WEIGHT: 128 LBS | TEMPERATURE: 96.9 F | SYSTOLIC BLOOD PRESSURE: 100 MMHG | OXYGEN SATURATION: 98 % | DIASTOLIC BLOOD PRESSURE: 70 MMHG | BODY MASS INDEX: 20.57 KG/M2 | HEIGHT: 66 IN | HEART RATE: 74 BPM

## 2022-12-06 DIAGNOSIS — R51.9 CHRONIC NONINTRACTABLE HEADACHE, UNSPECIFIED HEADACHE TYPE: Primary | ICD-10-CM

## 2022-12-06 DIAGNOSIS — G89.29 CHRONIC NONINTRACTABLE HEADACHE, UNSPECIFIED HEADACHE TYPE: Primary | ICD-10-CM

## 2022-12-06 PROCEDURE — 1090F PRES/ABSN URINE INCON ASSESS: CPT | Performed by: FAMILY MEDICINE

## 2022-12-06 PROCEDURE — G8427 DOCREV CUR MEDS BY ELIG CLIN: HCPCS | Performed by: FAMILY MEDICINE

## 2022-12-06 PROCEDURE — 99214 OFFICE O/P EST MOD 30 MIN: CPT | Performed by: FAMILY MEDICINE

## 2022-12-06 PROCEDURE — G8399 PT W/DXA RESULTS DOCUMENT: HCPCS | Performed by: FAMILY MEDICINE

## 2022-12-06 PROCEDURE — 1123F ACP DISCUSS/DSCN MKR DOCD: CPT | Performed by: FAMILY MEDICINE

## 2022-12-06 PROCEDURE — G8420 CALC BMI NORM PARAMETERS: HCPCS | Performed by: FAMILY MEDICINE

## 2022-12-06 PROCEDURE — G8484 FLU IMMUNIZE NO ADMIN: HCPCS | Performed by: FAMILY MEDICINE

## 2022-12-06 PROCEDURE — 1036F TOBACCO NON-USER: CPT | Performed by: FAMILY MEDICINE

## 2022-12-06 RX ORDER — NAPROXEN 250 MG/1
500 TABLET ORAL EVERY 12 HOURS PRN
Qty: 180 TABLET | Refills: 1 | Status: SHIPPED | OUTPATIENT
Start: 2022-12-06

## 2022-12-06 NOTE — PROGRESS NOTES
Chief Complaint   Patient presents with    Follow-up    Hypotension     Pt having issues with low BP     Headache     Pt has been waking up with headaches everyday. Denies dizziness. Has been occurring for the last month. HPI: Tamela Last 68 y.o. female presenting for     Headhache   Complaining of daily early morning headaches  Going on for 2 month   Denies any falls or trauma   Denies any changes in the headaches in the last month   Has low blood pressures that is chronic (not on any bp medication). Tried high dose tylenol with minimal relief. Denies URI symptoms. BP Readings from Last 20 Encounters:   12/06/22 100/70   03/17/22 112/68   09/02/21 120/68   03/02/21 110/80   02/02/21 108/70   01/26/21 100/70   10/23/19 116/72   10/07/19 100/80   08/29/18 100/64   03/20/18 110/70   01/26/18 122/64   12/04/17 110/82   09/20/16 90/60   06/14/16 98/64   03/22/16 90/60   02/24/16 100/60   12/03/15 98/64   10/13/15 98/66   05/05/15 114/66   06/25/14 120/74   ]    Current Outpatient Medications   Medication Sig Dispense Refill    triamcinolone (KENALOG) 0.1 % ointment Apply BID On the affected area for up to 7 days. Use a small amount. 80 g 1    primidone (MYSOLINE) 50 MG tablet Take 50 mg by mouth nightly      escitalopram (LEXAPRO) 10 MG tablet Take 10 mg by mouth daily      DULoxetine (CYMBALTA) 30 MG extended release capsule Take 1 capsule by mouth daily      hydrocortisone (ANUSOL-HC) 2.5 % CREA rectal cream Rub in twice a day as needed for itch and burn 1 Tube 3    gabapentin (NEURONTIN) 300 MG capsule TAKE ONE CAPSULE BY MOUTH 3 TIMES A DAY  5    esomeprazole (NEXIUM) 40 MG delayed release capsule TAKE 1 CAPSULE BY MOUTH DAILY 90 capsule 3    tiZANidine (ZANAFLEX) 4 MG tablet Take 1 tablet by mouth daily      Cholecalciferol (VITAMIN D3) 5000 UNITS CAPS Take 2 capsules by mouth daily. pilocarpine (SALAGEN) 5 MG tablet Take 1 tablet by mouth nightly.       traMADol (ULTRAM) 50 MG tablet Take 1 tablet by mouth daily as needed. traZODone (DESYREL) 50 MG tablet TAKE 1 TABLET BY MOUTH EVERY DAY AT NIGHT 30 tablet 8    Multiple Vitamin (MULTIVITAMIN PO) Take  by mouth daily. Biotin 1000 MCG TABS Take 1,000 mg by mouth daily. B COMPLEX-C PO Take  by mouth daily. fish oil-omega-3 fatty acids 1000 MG capsule Take 2 g by mouth daily. Potassium 99 MG TABS Take 99 mg by mouth daily. No current facility-administered medications for this visit. ROS  CONSTITUTIONAL: The patient denies fevers, chills, sweats and body ache. HEENT: Denies headache, blurry vision, eye pain, tinnitus, vertigo,  sore throat, neck or thyroid masses. RESPIRATORY: Denies cough, sputum, hemoptysis. CARDIAC: Denies chest pain, pressure, palpitations, Denies lower extremity edema. GASTROINTESTINAL: Denies abdominal pain, constipation, diarrhea, bleeding in the stools,   GENITOURINARY: Denies dysuria, hematuria, nocturia or frequency, urinary incontinence. NEUROLOGIC: Denies headaches, dizziness, syncope, weakness  MUSCULOSKELETAL: stable. ENDOCRINOLOGY: Denies heat or cold intolerance. HEMATOLOGY: Denies easy bleeding or blood transfusion,anemia  DERMATOLOGY: rash under the bra line. PSYCHIATRY: Denies depression, agitation or anxiety.     Past Medical History:   Diagnosis Date    Fibromyalgia     GERD (gastroesophageal reflux disease)     GERD    Irritable bowel syndrome     Senile osteoporosis         Past Surgical History:   Procedure Laterality Date    COLONOSCOPY  2010    Dr. Miladys Douglas Right     right thumb    HYSTERECTOMY  2008    total    JOINT REPLACEMENT Right 12/2016    KNEE ARTHROSCOPY      both knees-meniscal injury and wear    TONSILLECTOMY          Family History   Problem Relation Age of Onset    Breast Cancer Mother     Heart Disease Father     Coronary Art Dis Brother     Diabetes Paternal Grandmother     Coronary Art Dis Brother         Social History Socioeconomic History    Marital status:       Spouse name: Not on file    Number of children: Not on file    Years of education: Not on file    Highest education level: Not on file   Occupational History    Not on file   Tobacco Use    Smoking status: Never    Smokeless tobacco: Never   Vaping Use    Vaping Use: Never used   Substance and Sexual Activity    Alcohol use: Yes     Comment: occasional    Drug use: No    Sexual activity: Not Currently   Other Topics Concern    Not on file   Social History Narrative    Not on file     Social Determinants of Health     Financial Resource Strain: Low Risk     Difficulty of Paying Living Expenses: Not hard at all   Food Insecurity: No Food Insecurity    Worried About Running Out of Food in the Last Year: Never true    Ran Out of Food in the Last Year: Never true   Transportation Needs: Not on file   Physical Activity: Sufficiently Active    Days of Exercise per Week: 7 days    Minutes of Exercise per Session: 40 min   Stress: Not on file   Social Connections: Not on file   Intimate Partner Violence: Not on file   Housing Stability: Not on file        /70   Pulse 74   Temp 96.9 °F (36.1 °C) (Temporal)   Ht 5' 6\" (1.676 m)   Wt 128 lb (58.1 kg)   SpO2 98%   BMI 20.66 kg/m²        Physical Exam:    General appearance - alert, well appearing, and in no distress  Mental Status - alert, oriented to person, place, and time  Eyes - pupils equal and reactive, extraocular eye movements intact   Ears - bilateral TM's and external ear canals normal   Nose - normal and patent, no erythema, discharge or polyps   Sinuses - Normal paranasal sinuses without tenderness   Throat - mucous membranes moist, pharynx normal without lesions   Neck - supple, no significant adenopathy   Thyroid - thyroid is normal in size without nodules or tenderness    Chest - clear to auscultation, no wheezes, rales or rhonchi, symmetric air entry   Heart - normal rate, regular rhythm, normal S1, S2, no murmurs, rubs, clicks or gallops  Abdomen - soft, nontender, nondistended, no masses or organomegaly   Back exam - full range of motion, no tenderness, palpable spasm or pain on motion   Neurological - alert, oriented, normal speech, no focal findings or movement disorder noted   Musculoskeletal - no joint tenderness, deformity or swelling   Extremities -negative  Skin - erythematous papular rash. Nontender in nature. Labs   TSH   Date Value Ref Range Status   10/09/2019 2.670 0.440 - 3.860 uIU/mL Final     No results found for: TSH    Lab Results   Component Value Date     08/08/2022     08/08/2022    K 4.2 08/08/2022    K 4.2 08/08/2022     08/08/2022     08/08/2022    CO2 30 08/08/2022    CO2 30 08/08/2022    BUN 22 08/08/2022    BUN 22 08/08/2022    CREATININE 0.57 08/08/2022    CREATININE 0.57 08/08/2022    GLUCOSE 105 (H) 08/08/2022    GLUCOSE 105 (H) 08/08/2022    CALCIUM 9.6 08/08/2022    CALCIUM 9.6 08/08/2022    PROT 6.8 08/08/2022    LABALBU 4.6 08/08/2022    BILITOT 0.3 08/08/2022    ALKPHOS 39 (L) 08/08/2022    AST 25 08/08/2022    ALT 16 08/08/2022    LABGLOM >60.0 08/08/2022    LABGLOM >60.0 08/08/2022    GFRAA >60.0 08/08/2022    GFRAA >60.0 08/08/2022    GLOB 2.2 (L) 08/08/2022       Lab Results   Component Value Date    WBC 5.2 08/08/2022    HGB 12.1 08/08/2022    HCT 35.7 (L) 08/08/2022    MCV 93.6 08/08/2022     08/08/2022     Lab Results   Component Value Date    LABA1C 5.4 09/02/2021     No results found for: EAG      Xray left hand   FINDINGS:   A lucency is seen in the distal aspect of the middle phalanx of the fifth digit. It extends intra-articular. No displacement is seen. Degenerative changes are visualized and also osteopenia. Impression   Nondisplaced fracture is seen in the middle phalanx of the fifth digit. A/P: Benigno Mendez 68 y.o. female presenting for     1.  Chronic nonintractable headache, unspecified headache type    - CT HEAD WO CONTRAST; Future  - naproxen (NAPROSYN) 250 MG tablet; Take 2 tablets by mouth every 12 hours as needed for Pain  Dispense: 180 tablet; Refill: 1              Please note, this report has been partially produced using speech recognition software  and may cause  and /or contain errors related to that system including grammar, punctuation and spelling as well as words and phrases that may seem inappropriate. If there are questions or concerns please feel free to contact me to clarify. On the basis of positive falls risk screening, assessment and plan is as follows: home safety tips provided.

## 2022-12-08 ENCOUNTER — HOSPITAL ENCOUNTER (OUTPATIENT)
Dept: CT IMAGING | Age: 77
Discharge: HOME | End: 2022-12-10
Payer: MEDICARE

## 2022-12-08 DIAGNOSIS — G89.29 CHRONIC NONINTRACTABLE HEADACHE, UNSPECIFIED HEADACHE TYPE: ICD-10-CM

## 2022-12-08 DIAGNOSIS — R51.9 CHRONIC NONINTRACTABLE HEADACHE, UNSPECIFIED HEADACHE TYPE: ICD-10-CM

## 2022-12-08 PROCEDURE — G1010 CDSM STANSON: HCPCS

## 2023-01-12 RX ORDER — ESOMEPRAZOLE MAGNESIUM 40 MG/1
CAPSULE, DELAYED RELEASE ORAL
Qty: 90 CAPSULE | Refills: 3 | Status: SHIPPED | OUTPATIENT
Start: 2023-01-12 | End: 2023-01-12 | Stop reason: SDUPTHER

## 2023-01-12 NOTE — TELEPHONE ENCOUNTER
Please call her insurance and update them on the nexium prescription.   Brand name nexium was sent to [facility].

## 2023-01-12 NOTE — TELEPHONE ENCOUNTER
Called patient to let her know her prescription request was sent to pharmacy. Pt stated that [facility] needed to be updated on her nexium prescription. She states it has to be name brand only not generic brand. Please advise.

## 2023-01-12 NOTE — TELEPHONE ENCOUNTER
Patient came to the office to request an order for Nexium 40 mg Delayed release. Her Insurance changed and was wondering if it can be approved and her pharmacy is [facility] in [place]. Please advise. Patient's tel# [phone].

## 2023-03-17 ENCOUNTER — OFFICE VISIT (OUTPATIENT)
Dept: FAMILY MEDICINE CLINIC | Age: 78
End: 2023-03-17

## 2023-03-17 VITALS
BODY MASS INDEX: 20.73 KG/M2 | TEMPERATURE: 96.9 F | HEIGHT: 66 IN | OXYGEN SATURATION: 98 % | DIASTOLIC BLOOD PRESSURE: 68 MMHG | SYSTOLIC BLOOD PRESSURE: 122 MMHG | HEART RATE: 80 BPM | WEIGHT: 129 LBS

## 2023-03-17 DIAGNOSIS — R51.9 CHRONIC NONINTRACTABLE HEADACHE, UNSPECIFIED HEADACHE TYPE: ICD-10-CM

## 2023-03-17 DIAGNOSIS — R25.1 TREMOR: ICD-10-CM

## 2023-03-17 DIAGNOSIS — E67.8 HYPERVITAMINOSIS: Primary | ICD-10-CM

## 2023-03-17 DIAGNOSIS — R59.0 CERVICAL LYMPHADENOPATHY: ICD-10-CM

## 2023-03-17 DIAGNOSIS — F32.5 MAJOR DEPRESSIVE DISORDER, SINGLE EPISODE, IN FULL REMISSION (HCC): ICD-10-CM

## 2023-03-17 DIAGNOSIS — M35.01 SJOGREN'S SYNDROME WITH KERATOCONJUNCTIVITIS SICCA (HCC): ICD-10-CM

## 2023-03-17 DIAGNOSIS — M79.7 FIBROMYALGIA: ICD-10-CM

## 2023-03-17 DIAGNOSIS — E67.8 HYPERVITAMINOSIS: ICD-10-CM

## 2023-03-17 DIAGNOSIS — M81.0 SENILE OSTEOPOROSIS: ICD-10-CM

## 2023-03-17 DIAGNOSIS — G89.29 CHRONIC NONINTRACTABLE HEADACHE, UNSPECIFIED HEADACHE TYPE: ICD-10-CM

## 2023-03-17 LAB — TSH REFLEX: 1.71 UIU/ML (ref 0.44–3.86)

## 2023-03-17 SDOH — ECONOMIC STABILITY: INCOME INSECURITY: HOW HARD IS IT FOR YOU TO PAY FOR THE VERY BASICS LIKE FOOD, HOUSING, MEDICAL CARE, AND HEATING?: NOT HARD AT ALL

## 2023-03-17 SDOH — ECONOMIC STABILITY: HOUSING INSECURITY
IN THE LAST 12 MONTHS, WAS THERE A TIME WHEN YOU DID NOT HAVE A STEADY PLACE TO SLEEP OR SLEPT IN A SHELTER (INCLUDING NOW)?: NO

## 2023-03-17 SDOH — ECONOMIC STABILITY: FOOD INSECURITY: WITHIN THE PAST 12 MONTHS, YOU WORRIED THAT YOUR FOOD WOULD RUN OUT BEFORE YOU GOT MONEY TO BUY MORE.: NEVER TRUE

## 2023-03-17 SDOH — ECONOMIC STABILITY: FOOD INSECURITY: WITHIN THE PAST 12 MONTHS, THE FOOD YOU BOUGHT JUST DIDN'T LAST AND YOU DIDN'T HAVE MONEY TO GET MORE.: NEVER TRUE

## 2023-03-17 ASSESSMENT — PATIENT HEALTH QUESTIONNAIRE - PHQ9
8. MOVING OR SPEAKING SO SLOWLY THAT OTHER PEOPLE COULD HAVE NOTICED. OR THE OPPOSITE, BEING SO FIGETY OR RESTLESS THAT YOU HAVE BEEN MOVING AROUND A LOT MORE THAN USUAL: 0
5. POOR APPETITE OR OVEREATING: 0
SUM OF ALL RESPONSES TO PHQ QUESTIONS 1-9: 0
10. IF YOU CHECKED OFF ANY PROBLEMS, HOW DIFFICULT HAVE THESE PROBLEMS MADE IT FOR YOU TO DO YOUR WORK, TAKE CARE OF THINGS AT HOME, OR GET ALONG WITH OTHER PEOPLE: 0
SUM OF ALL RESPONSES TO PHQ QUESTIONS 1-9: 0
6. FEELING BAD ABOUT YOURSELF - OR THAT YOU ARE A FAILURE OR HAVE LET YOURSELF OR YOUR FAMILY DOWN: 0
7. TROUBLE CONCENTRATING ON THINGS, SUCH AS READING THE NEWSPAPER OR WATCHING TELEVISION: 0
2. FEELING DOWN, DEPRESSED OR HOPELESS: 0
1. LITTLE INTEREST OR PLEASURE IN DOING THINGS: 0
4. FEELING TIRED OR HAVING LITTLE ENERGY: 0
3. TROUBLE FALLING OR STAYING ASLEEP: 0
9. THOUGHTS THAT YOU WOULD BE BETTER OFF DEAD, OR OF HURTING YOURSELF: 0
SUM OF ALL RESPONSES TO PHQ QUESTIONS 1-9: 0
SUM OF ALL RESPONSES TO PHQ9 QUESTIONS 1 & 2: 0
SUM OF ALL RESPONSES TO PHQ QUESTIONS 1-9: 0

## 2023-03-17 NOTE — PROGRESS NOTES
Chief Complaint   Patient presents with    1 Year Follow Up    Mass     Patient has a bump on left side of neck. It's near thyroid. Hurts. No troubles swallowing. HPI: Rigo Savage 68 y.o. female presenting for     Hypervitaminosis D   Patient was taking vitmain D   Managed by rheum  Noted to be elevated   Vitamin d was stopped   Needs levels rechecked. Sjogren   Still has dry mouth   And eye drops - is on the albumin.  - managed by ophtalmology     Thryoidmegaly   Feels like the thryoid was englarge   Admits to some pain   Has baseline tremors - familial       Familial tremors   Patient takes primidone for tremors   Unsure if it helps     Follow-up   was seen by neurology. Was placed on the medication but patient is unsure what it was. Stopped the medication due to unwanted side effects (sedative properties). Fibromyalgia  Patient is takign lexapro   Using it to banllance the cymblata for hte pain   gabapetin 300 mg TID   Take zanaxine     Insomina   Take the trazodone. GERD   Take tne nexium   Stable at this time. Current Outpatient Medications   Medication Sig Dispense Refill    NEXIUM 40 MG delayed release capsule TAKE 1 CAPSULE BY MOUTH DAILY. Brand name only. 90 capsule 3    naproxen (NAPROSYN) 250 MG tablet Take 2 tablets by mouth every 12 hours as needed for Pain 180 tablet 1    triamcinolone (KENALOG) 0.1 % ointment Apply BID On the affected area for up to 7 days. Use a small amount.  80 g 1    primidone (MYSOLINE) 50 MG tablet Take 50 mg by mouth nightly      escitalopram (LEXAPRO) 10 MG tablet Take 10 mg by mouth daily      DULoxetine (CYMBALTA) 30 MG extended release capsule Take 1 capsule by mouth daily      hydrocortisone (ANUSOL-HC) 2.5 % CREA rectal cream Rub in twice a day as needed for itch and burn 1 Tube 3    gabapentin (NEURONTIN) 300 MG capsule TAKE ONE CAPSULE BY MOUTH 3 TIMES A DAY  5    tiZANidine (ZANAFLEX) 4 MG tablet Take 1 tablet by mouth daily Cholecalciferol (VITAMIN D3) 5000 UNITS CAPS Take 2 capsules by mouth daily. pilocarpine (SALAGEN) 5 MG tablet Take 1 tablet by mouth nightly. traMADol (ULTRAM) 50 MG tablet Take 1 tablet by mouth daily as needed. traZODone (DESYREL) 50 MG tablet TAKE 1 TABLET BY MOUTH EVERY DAY AT NIGHT 30 tablet 8    Multiple Vitamin (MULTIVITAMIN PO) Take  by mouth daily. Biotin 1000 MCG TABS Take 1,000 mg by mouth daily. B COMPLEX-C PO Take  by mouth daily. fish oil-omega-3 fatty acids 1000 MG capsule Take 2 g by mouth daily. Potassium 99 MG TABS Take 99 mg by mouth daily. No current facility-administered medications for this visit. ROS  CONSTITUTIONAL: The patient denies fevers, chills, sweats and body ache. HEENT: Denies headache, blurry vision, eye pain, tinnitus, vertigo,  sore throat, neck or thyroid masses. RESPIRATORY: Denies cough, sputum, hemoptysis. CARDIAC: Denies chest pain, pressure, palpitations, Denies lower extremity edema. GASTROINTESTINAL: Denies abdominal pain, constipation, diarrhea, bleeding in the stools,   GENITOURINARY: Denies dysuria, hematuria, nocturia or frequency, urinary incontinence. NEUROLOGIC: Denies headaches, dizziness, syncope, weakness  MUSCULOSKELETAL: stable. ENDOCRINOLOGY: Denies heat or cold intolerance. HEMATOLOGY: Denies easy bleeding or blood transfusion,anemia  DERMATOLOGY: rash under the bra line. PSYCHIATRY: Denies depression, agitation or anxiety.     Past Medical History:   Diagnosis Date    Fibromyalgia     GERD (gastroesophageal reflux disease)     GERD    Irritable bowel syndrome     Senile osteoporosis         Past Surgical History:   Procedure Laterality Date    COLONOSCOPY  2010    Dr. Noreen Quinn Right     right thumb    HYSTERECTOMY  2008    total    JOINT REPLACEMENT Right 12/2016    KNEE ARTHROSCOPY      both knees-meniscal injury and wear    TONSILLECTOMY          Family History   Problem Relation Age of Onset    Breast Cancer Mother     Heart Disease Father     Coronary Art Dis Brother     Diabetes Paternal Grandmother     Coronary Art Dis Brother         Social History     Socioeconomic History    Marital status:      Spouse name: Not on file    Number of children: Not on file    Years of education: Not on file    Highest education level: Not on file   Occupational History    Not on file   Tobacco Use    Smoking status: Never    Smokeless tobacco: Never   Vaping Use    Vaping Use: Never used   Substance and Sexual Activity    Alcohol use: Yes     Comment: occasional    Drug use: No    Sexual activity: Not Currently   Other Topics Concern    Not on file   Social History Narrative    Not on file     Social Determinants of Health     Financial Resource Strain: Low Risk     Difficulty of Paying Living Expenses: Not hard at all   Food Insecurity: No Food Insecurity    Worried About [address] in the Last Year: Never true    [address] in the Last Year: Never true   Transportation Needs: Unknown    Lack of Transportation (Medical): Not on file    Lack of Transportation (Non-Medical):  No   Physical Activity: Sufficiently Active    Days of Exercise per Week: 7 days    Minutes of Exercise per Session: 40 min   Stress: Not on file   Social Connections: Not on file   Intimate Partner Violence: Not on file   Housing Stability: Unknown    Unable to Pay for Housing in the Last Year: Not on file    Number of Places Lived in the Last Year: Not on file    Unstable Housing in the Last Year: No        /68   Pulse 80   Temp 96.9 °F (36.1 °C) (Temporal)   Ht 5' 6\" (1.676 m)   Wt 129 lb (58.5 kg)   SpO2 98%   BMI 20.82 kg/m²        Physical Exam:    General appearance - alert, well appearing, and in no distress  Mental Status - alert, oriented to person, place, and time  Eyes - pupils equal and reactive, extraocular eye movements intact   Ears - bilateral TM's and external ear canals normal   Nose - normal and patent, no erythema, discharge or polyps   Sinuses - Normal paranasal sinuses without tenderness   Throat - mucous membranes moist, pharynx normal without lesions   Neck - tenderness to palpation in the anterior cervical lymph nodes on the left side. Thyroid - thyroid is normal in size without nodules or tenderness    Chest - clear to auscultation, no wheezes, rales or rhonchi, symmetric air entry   Heart - normal rate, regular rhythm, normal S1, S2, no murmurs, rubs, clicks or gallops  Abdomen - soft, nontender, nondistended, no masses or organomegaly   Back exam - full range of motion, no tenderness, palpable spasm or pain on motion   Neurological - alert, oriented, normal speech, no focal findings or movement disorder noted   Musculoskeletal - no joint tenderness, deformity or swelling   Extremities -negative  Skin - erythematous papular rash. Nontender in nature.        Labs   TSH   Date Value Ref Range Status   10/09/2019 2.670 0.440 - 3.860 uIU/mL Final     No results found for: TSH    Lab Results   Component Value Date     08/08/2022     08/08/2022    K 4.2 08/08/2022    K 4.2 08/08/2022     08/08/2022     08/08/2022    CO2 30 08/08/2022    CO2 30 08/08/2022    BUN 22 08/08/2022    BUN 22 08/08/2022    CREATININE 0.57 08/08/2022    CREATININE 0.57 08/08/2022    GLUCOSE 105 (H) 08/08/2022    GLUCOSE 105 (H) 08/08/2022    CALCIUM 9.6 08/08/2022    CALCIUM 9.6 08/08/2022    PROT 6.8 08/08/2022    LABALBU 4.6 08/08/2022    BILITOT 0.3 08/08/2022    ALKPHOS 39 (L) 08/08/2022    AST 25 08/08/2022    ALT 16 08/08/2022    LABGLOM >60.0 08/08/2022    LABGLOM >60.0 08/08/2022    GFRAA >60.0 08/08/2022    GFRAA >60.0 08/08/2022    GLOB 2.2 (L) 08/08/2022       Lab Results   Component Value Date    WBC 5.2 08/08/2022    HGB 12.1 08/08/2022    HCT 35.7 (L) 08/08/2022    MCV 93.6 08/08/2022     08/08/2022     Lab Results   Component Value Date    LABA1C 5.4 09/02/2021     No results found for: EAG      Xray left hand   FINDINGS:   A lucency is seen in the distal aspect of the middle phalanx of the fifth digit. It extends intra-articular. No displacement is seen. Degenerative changes are visualized and also osteopenia. Impression   Nondisplaced fracture is seen in the middle phalanx of the fifth digit. A/P: Frederick Arevalo 68 y.o. female presenting for     1. Hypervitaminosis    - Vitamin D 25 Hydroxy; Future    2. Chronic nonintractable headache, unspecified headache type      3. Major depressive disorder, single episode, in full remission ([address]  Stable   Denies any SI or hI     4. Cervical lymphadenopathy    - TSH with Reflex; Future  - US Soft Tissue Limited Area; Future    5. Tremor    - TSH with Reflex; Future    6. Fibromyalgia      7. Senile osteoporosis  Gets prolia injections   Due for dexa scan     8. Sjogren's syndrome with keratoconjunctivitis sicca ([facility] [address]  F/u with ophthalmology               Please note, this report has been partially produced using speech recognition software  and may cause  and /or contain errors related to that system including grammar, punctuation and spelling as well as words and phrases that may seem inappropriate. If there are questions or concerns please feel free to contact me to clarify. On the basis of positive falls risk screening, assessment and plan is as follows: home safety tips provided.

## 2023-03-18 LAB — VITAMIN D 25-HYDROXY: 78.4 NG/ML

## 2023-03-28 ENCOUNTER — HOSPITAL ENCOUNTER (EMERGENCY)
Age: 78
Discharge: HOME | End: 2023-03-28
Payer: MEDICARE

## 2023-03-28 VITALS
HEART RATE: 87 BPM | WEIGHT: 128 LBS | TEMPERATURE: 98 F | BODY MASS INDEX: 20.57 KG/M2 | SYSTOLIC BLOOD PRESSURE: 104 MMHG | HEIGHT: 66 IN | RESPIRATION RATE: 18 BRPM | DIASTOLIC BLOOD PRESSURE: 61 MMHG | OXYGEN SATURATION: 98 %

## 2023-03-28 DIAGNOSIS — U07.1 LAB TEST POSITIVE FOR DETECTION OF COVID-19 VIRUS: Primary | ICD-10-CM

## 2023-03-28 LAB
ALBUMIN SERPL-MCNC: 4.4 G/DL (ref 3.5–4.6)
ALP SERPL-CCNC: 42 U/L (ref 40–130)
ALT SERPL-CCNC: 14 U/L (ref 0–33)
ANION GAP SERPL CALCULATED.3IONS-SCNC: 7 MEQ/L (ref 9–15)
AST SERPL-CCNC: 22 U/L (ref 0–35)
BASOPHILS # BLD: 0 K/UL (ref 0–0.2)
BASOPHILS NFR BLD: 0.3 %
BILIRUB SERPL-MCNC: 0.3 MG/DL (ref 0.2–0.7)
BUN SERPL-MCNC: 28 MG/DL (ref 8–23)
CALCIUM SERPL-MCNC: 9.1 MG/DL (ref 8.5–9.9)
CHLORIDE SERPL-SCNC: 101 MEQ/L (ref 95–107)
CO2 SERPL-SCNC: 27 MEQ/L (ref 20–31)
CREAT SERPL-MCNC: 0.62 MG/DL (ref 0.5–0.9)
EOSINOPHIL # BLD: 0 K/UL (ref 0–0.7)
EOSINOPHIL NFR BLD: 0.3 %
ERYTHROCYTE [DISTWIDTH] IN BLOOD BY AUTOMATED COUNT: 13.4 % (ref 11.5–14.5)
GLOBULIN SER CALC-MCNC: 2.1 G/DL (ref 2.3–3.5)
GLUCOSE SERPL-MCNC: 95 MG/DL (ref 70–99)
HCT VFR BLD AUTO: 34.2 % (ref 37–47)
HGB BLD-MCNC: 11.3 G/DL (ref 12–16)
INFLUENZA A BY PCR: NEGATIVE
INFLUENZA B BY PCR: NEGATIVE
LYMPHOCYTES # BLD: 0.4 K/UL (ref 1–4.8)
LYMPHOCYTES NFR BLD: 8.8 %
MAGNESIUM SERPL-MCNC: 2.1 MG/DL (ref 1.7–2.4)
MCH RBC QN AUTO: 31.3 PG (ref 27–31.3)
MCHC RBC AUTO-ENTMCNC: 33.1 % (ref 33–37)
MCV RBC AUTO: 94.5 FL (ref 79.4–94.8)
MONOCYTES # BLD: 0.6 K/UL (ref 0.2–0.8)
MONOCYTES NFR BLD: 12.8 %
NEUTROPHILS # BLD: 3.9 K/UL (ref 1.4–6.5)
NEUTS SEG NFR BLD: 77.8 %
PLATELET # BLD AUTO: 203 K/UL (ref 130–400)
POTASSIUM SERPL-SCNC: 4.4 MEQ/L (ref 3.4–4.9)
PROT SERPL-MCNC: 6.5 G/DL (ref 6.3–8)
RBC # BLD AUTO: 3.62 M/UL (ref 4.2–5.4)
SARS-COV-2 RDRP RESP QL NAA+PROBE: DETECTED
SODIUM SERPL-SCNC: 135 MEQ/L (ref 135–144)
WBC # BLD AUTO: 5.1 K/UL (ref 4.8–10.8)

## 2023-03-28 PROCEDURE — 99283 EMERGENCY DEPT VISIT LOW MDM: CPT

## 2023-03-28 PROCEDURE — 36415 COLL VENOUS BLD VENIPUNCTURE: CPT

## 2023-03-28 PROCEDURE — 80053 COMPREHEN METABOLIC PANEL: CPT

## 2023-03-28 PROCEDURE — 87502 INFLUENZA DNA AMP PROBE: CPT

## 2023-03-28 PROCEDURE — 83735 ASSAY OF MAGNESIUM: CPT

## 2023-03-28 PROCEDURE — 87635 SARS-COV-2 COVID-19 AMP PRB: CPT

## 2023-03-28 PROCEDURE — 85025 COMPLETE CBC W/AUTO DIFF WBC: CPT

## 2023-03-28 PROCEDURE — 6370000000 HC RX 637 (ALT 250 FOR IP): Performed by: NURSE PRACTITIONER

## 2023-03-28 RX ORDER — ACETAMINOPHEN 500 MG
1000 TABLET ORAL ONCE
Status: COMPLETED | OUTPATIENT
Start: 2023-03-28 | End: 2023-03-28

## 2023-03-28 RX ADMIN — ACETAMINOPHEN 1000 MG: 500 TABLET ORAL at 18:48

## 2023-03-28 ASSESSMENT — PAIN DESCRIPTION - LOCATION
LOCATION: OTHER (COMMENT)
LOCATION: GENERALIZED

## 2023-03-28 ASSESSMENT — PAIN SCALES - GENERAL
PAINLEVEL_OUTOF10: 5
PAINLEVEL_OUTOF10: 7

## 2023-03-28 ASSESSMENT — ENCOUNTER SYMPTOMS
BACK PAIN: 0
DIARRHEA: 0
ABDOMINAL PAIN: 0
SORE THROAT: 0
COUGH: 0
TROUBLE SWALLOWING: 0
VOMITING: 0
SHORTNESS OF BREATH: 0
NAUSEA: 0

## 2023-03-28 ASSESSMENT — PAIN DESCRIPTION - ONSET: ONSET: ON-GOING

## 2023-03-28 ASSESSMENT — PAIN DESCRIPTION - DESCRIPTORS
DESCRIPTORS: ACHING
DESCRIPTORS: ACHING

## 2023-03-28 ASSESSMENT — PAIN - FUNCTIONAL ASSESSMENT: PAIN_FUNCTIONAL_ASSESSMENT: 0-10

## 2023-03-28 ASSESSMENT — PAIN DESCRIPTION - FREQUENCY: FREQUENCY: CONTINUOUS

## 2023-03-28 NOTE — ED NOTES
Pt complaining of general body aches. Pt states going on for 1 days now, not getting better.      Bonifacio Pang  03/28/23 3794 No

## 2023-03-28 NOTE — ED PROVIDER NOTES
subscore is 6. Deep Tendon Reflexes: Reflexes are normal and symmetric. Psychiatric:         Judgment: Judgment normal.         All other labs were within normal range or not returned as of this dictation. EMERGENCY DEPARTMENT COURSE and DIFFERENTIALDIAGNOSIS/MDM:   Vitals:    Vitals:    03/28/23 1811   BP: 104/61   Pulse: 87   Resp: 18   Temp: 98 °F (36.7 °C)   TempSrc: Temporal   SpO2: 98%   Weight: 128 lb (58.1 kg)   Height: 5' 6\" (1.676 m)       Medical Decision Making  Amount and/or Complexity of Data Reviewed  Labs: ordered. Risk  OTC drugs. 68 yr old female with positive COVID. Patient is stable at discharge. Encouraged to drink fluids and rest at home. F/U With PCP in 1 week. Labs are unremarkable otherwise. Patient verbalizes understanding. PROCEDURES:  Unless otherwise noted below, none     Procedures      FINAL IMPRESSION      1.  Lab test positive for detection of COVID-19 virus          DISPOSITION/PLAN   DISPOSITION Decision To Discharge 03/28/2023 07:25:18 PM          LINDSAY Bhatt CNP (electronically signed)  Attending Emergency Physician      LINDSAY Bhatt CNP  03/28/23 1927

## 2023-04-18 ENCOUNTER — TELEPHONE (OUTPATIENT)
Dept: FAMILY MEDICINE CLINIC | Age: 78
End: 2023-04-18

## 2023-04-18 DIAGNOSIS — R59.1 LYMPHADENOPATHY: Primary | ICD-10-CM

## 2023-04-18 DIAGNOSIS — R59.0 CERVICAL LYMPHADENOPATHY: ICD-10-CM

## 2023-04-18 NOTE — TELEPHONE ENCOUNTER
Patient called asking for the results from her ultra sound she had done on 4/11. Patient also received a letter from [facility] about the Nexium. They do not cover it and list omeprazole and pantoprazole as covered alternatives. Patient states she cannot take anything but Nexium. The letter did say the pcp could provide a statement supporting the patient's request for the Nexium. She is asking if you are able to provide that for her. Thank you.

## 2023-04-27 ENCOUNTER — HOSPITAL ENCOUNTER (OUTPATIENT)
Dept: CT IMAGING | Age: 78
Discharge: HOME | End: 2023-04-29
Payer: MEDICARE

## 2023-04-27 DIAGNOSIS — R59.0 CERVICAL LYMPHADENOPATHY: ICD-10-CM

## 2023-04-27 DIAGNOSIS — R59.1 LYMPHADENOPATHY: ICD-10-CM

## 2023-04-27 PROCEDURE — 70491 CT SOFT TISSUE NECK W/DYE: CPT

## 2023-04-27 PROCEDURE — 6360000004 HC RX CONTRAST MEDICATION: Performed by: FAMILY MEDICINE

## 2023-04-27 RX ADMIN — IOPAMIDOL 75 ML: 612 INJECTION, SOLUTION INTRAVENOUS at 13:25

## 2023-04-28 DIAGNOSIS — E04.9 ENLARGED THYROID: ICD-10-CM

## 2023-04-28 DIAGNOSIS — E04.1 THYROID NODULE: Primary | ICD-10-CM

## 2023-05-10 ENCOUNTER — TELEPHONE (OUTPATIENT)
Dept: FAMILY MEDICINE CLINIC | Age: 78
End: 2023-05-10

## 2023-05-30 ENCOUNTER — OFFICE VISIT (OUTPATIENT)
Dept: ENDOCRINOLOGY | Age: 78
End: 2023-05-30
Payer: MEDICARE

## 2023-05-30 VITALS
DIASTOLIC BLOOD PRESSURE: 65 MMHG | SYSTOLIC BLOOD PRESSURE: 90 MMHG | WEIGHT: 129 LBS | HEIGHT: 66 IN | HEART RATE: 90 BPM | BODY MASS INDEX: 20.73 KG/M2 | OXYGEN SATURATION: 97 %

## 2023-05-30 DIAGNOSIS — E01.0 THYROMEGALY: Primary | ICD-10-CM

## 2023-05-30 PROCEDURE — 99204 OFFICE O/P NEW MOD 45 MIN: CPT | Performed by: PHYSICIAN ASSISTANT

## 2023-05-30 PROCEDURE — 1036F TOBACCO NON-USER: CPT | Performed by: PHYSICIAN ASSISTANT

## 2023-05-30 PROCEDURE — G8427 DOCREV CUR MEDS BY ELIG CLIN: HCPCS | Performed by: PHYSICIAN ASSISTANT

## 2023-05-30 PROCEDURE — 1123F ACP DISCUSS/DSCN MKR DOCD: CPT | Performed by: PHYSICIAN ASSISTANT

## 2023-05-30 PROCEDURE — G8420 CALC BMI NORM PARAMETERS: HCPCS | Performed by: PHYSICIAN ASSISTANT

## 2023-05-30 PROCEDURE — G8399 PT W/DXA RESULTS DOCUMENT: HCPCS | Performed by: PHYSICIAN ASSISTANT

## 2023-05-30 PROCEDURE — 1090F PRES/ABSN URINE INCON ASSESS: CPT | Performed by: PHYSICIAN ASSISTANT

## 2023-05-30 ASSESSMENT — ENCOUNTER SYMPTOMS
SORE THROAT: 0
ABDOMINAL PAIN: 0
RHINORRHEA: 0
VOMITING: 0
WHEEZING: 0
DIARRHEA: 0
COUGH: 0
SHORTNESS OF BREATH: 0
SINUS PRESSURE: 0
NAUSEA: 0

## 2023-05-30 NOTE — PROGRESS NOTES
5/30/2023    Assessment:       Diagnosis Orders   1. Thyromegaly  US HEAD NECK SOFT TISSUE THYROID    TSH    T4, Free    Thyroid Peroxidase Antibody        PLAN:     Thyroid US ordered  Thyroid labs ordered  Follow up in 8 weeks to discuss results       Orders Placed This Encounter   Procedures    US HEAD NECK SOFT TISSUE THYROID     Standing Status:   Future     Standing Expiration Date:   5/30/2024    TSH     Standing Status:   Future     Standing Expiration Date:   5/30/2024    T4, Free     Standing Status:   Future     Standing Expiration Date:   5/30/2024    Thyroid Peroxidase Antibody     Standing Status:   Future     Standing Expiration Date:   5/30/2024     No orders of the defined types were placed in this encounter. No follow-ups on file. Subjective:     Chief Complaint   Patient presents with    New Patient    Thyroid Problem     - Enlarged thyroid  Thyroid nodule     Vitals:    05/30/23 1547   BP: 90/65   Pulse: 90   SpO2: 97%   Weight: 129 lb (58.5 kg)   Height: 5' 6\" (1.676 m)     Wt Readings from Last 3 Encounters:   05/30/23 129 lb (58.5 kg)   03/28/23 128 lb (58.1 kg)   03/17/23 129 lb (58.5 kg)     BP Readings from Last 3 Encounters:   05/30/23 90/65   03/28/23 104/61   03/17/23 122/68     Leslie Tirado is a 59-year-old female presents today for evaluation of enlarged thyroid on CT scan of the neck. She noticed to enlarged submandibular lymph nodes, inflamed, intermittently painful. She had an ultrasound of those lymph nodes done and a CT of the neck. CT showed enlarged right thyroid gland. There is no family history of thyroid disease. Thyroid laboratory studies were all reviewed and within normal limits. Going to order thyroid ultrasound, repeat thyroid labs including TPO. We will make further recommendations based on those results.     Past Medical History:   Diagnosis Date    Fibromyalgia     GERD (gastroesophageal reflux disease)     GERD    Irritable bowel syndrome     Senile osteoporosis

## 2023-06-05 ENCOUNTER — HOSPITAL ENCOUNTER (OUTPATIENT)
Dept: ULTRASOUND IMAGING | Age: 78
Discharge: HOME | End: 2023-06-07
Payer: MEDICARE

## 2023-06-05 DIAGNOSIS — E01.0 THYROMEGALY: ICD-10-CM

## 2023-06-05 PROCEDURE — 76536 US EXAM OF HEAD AND NECK: CPT

## 2023-06-07 DIAGNOSIS — E04.1 THYROID NODULE: Primary | ICD-10-CM

## 2023-06-22 DIAGNOSIS — E04.1 THYROID NODULE: ICD-10-CM

## 2023-06-22 DIAGNOSIS — E07.89 OTHER SPECIFIED DISORDERS OF THYROID: ICD-10-CM

## 2023-06-22 DIAGNOSIS — E01.0 THYROMEGALY: ICD-10-CM

## 2023-06-22 LAB
INR PPP: 0.9
PROTHROMBIN TIME: 12.7 SEC (ref 12.3–14.9)

## 2023-06-29 ENCOUNTER — PROCEDURE VISIT (OUTPATIENT)
Dept: INTERVENTIONAL RADIOLOGY/VASCULAR | Age: 78
End: 2023-06-29

## 2023-06-29 VITALS
HEART RATE: 82 BPM | WEIGHT: 129 LBS | SYSTOLIC BLOOD PRESSURE: 100 MMHG | DIASTOLIC BLOOD PRESSURE: 62 MMHG | BODY MASS INDEX: 20.82 KG/M2

## 2023-06-29 DIAGNOSIS — E04.1 THYROID NODULE: ICD-10-CM

## 2023-06-29 DIAGNOSIS — E04.1 THYROID NODULE: Primary | ICD-10-CM

## 2023-07-18 DIAGNOSIS — E01.0 THYROMEGALY: ICD-10-CM

## 2023-07-18 LAB
T4 FREE SERPL-MCNC: 0.96 NG/DL (ref 0.84–1.68)
TSH SERPL-MCNC: 2.63 UIU/ML (ref 0.44–3.86)

## 2023-07-19 LAB — THYROPEROXIDASE IGG SERPL-ACNC: <4 IU/ML (ref 0–25)

## 2023-07-25 ENCOUNTER — OFFICE VISIT (OUTPATIENT)
Dept: ENDOCRINOLOGY | Age: 78
End: 2023-07-25
Payer: MEDICARE

## 2023-07-25 VITALS
SYSTOLIC BLOOD PRESSURE: 118 MMHG | BODY MASS INDEX: 20.25 KG/M2 | HEIGHT: 67 IN | DIASTOLIC BLOOD PRESSURE: 73 MMHG | HEART RATE: 71 BPM | WEIGHT: 129 LBS | OXYGEN SATURATION: 98 %

## 2023-07-25 DIAGNOSIS — E04.1 THYROID NODULE: Primary | ICD-10-CM

## 2023-07-25 PROCEDURE — 99214 OFFICE O/P EST MOD 30 MIN: CPT | Performed by: PHYSICIAN ASSISTANT

## 2023-07-25 PROCEDURE — 1036F TOBACCO NON-USER: CPT | Performed by: PHYSICIAN ASSISTANT

## 2023-07-25 PROCEDURE — G8399 PT W/DXA RESULTS DOCUMENT: HCPCS | Performed by: PHYSICIAN ASSISTANT

## 2023-07-25 PROCEDURE — G8420 CALC BMI NORM PARAMETERS: HCPCS | Performed by: PHYSICIAN ASSISTANT

## 2023-07-25 PROCEDURE — 1123F ACP DISCUSS/DSCN MKR DOCD: CPT | Performed by: PHYSICIAN ASSISTANT

## 2023-07-25 PROCEDURE — 1090F PRES/ABSN URINE INCON ASSESS: CPT | Performed by: PHYSICIAN ASSISTANT

## 2023-07-25 PROCEDURE — G8427 DOCREV CUR MEDS BY ELIG CLIN: HCPCS | Performed by: PHYSICIAN ASSISTANT

## 2023-07-25 RX ORDER — ESCITALOPRAM OXALATE 20 MG/1
TABLET ORAL
COMMUNITY
Start: 2023-07-23

## 2023-07-25 ASSESSMENT — ENCOUNTER SYMPTOMS
VOMITING: 0
SHORTNESS OF BREATH: 0
NAUSEA: 0
SINUS PRESSURE: 0
RHINORRHEA: 0
DIARRHEA: 0
WHEEZING: 0
SORE THROAT: 0
ABDOMINAL PAIN: 0
COUGH: 0

## 2023-07-25 NOTE — PROGRESS NOTES
Rfl:     fish oil-omega-3 fatty acids 1000 MG capsule, Take 2 capsules by mouth daily, Disp: , Rfl:     Potassium 99 MG TABS, Take 1 tablet by mouth daily, Disp: , Rfl:   Lab Results   Component Value Date     03/28/2023    K 4.4 03/28/2023     03/28/2023    CO2 27 03/28/2023    BUN 28 (H) 03/28/2023    CREATININE 0.62 03/28/2023    GLUCOSE 95 03/28/2023    CALCIUM 9.1 03/28/2023    PROT 6.5 03/28/2023    LABALBU 4.4 03/28/2023    BILITOT 0.3 03/28/2023    ALKPHOS 42 03/28/2023    AST 22 03/28/2023    ALT 14 03/28/2023    LABGLOM >60.0 03/28/2023    GFRAA >60.0 08/08/2022    GFRAA >60.0 08/08/2022    GLOB 2.1 (L) 03/28/2023     Lab Results   Component Value Date    WBC 5.1 03/28/2023    HGB 11.3 (L) 03/28/2023    HCT 34.2 (L) 03/28/2023    MCV 94.5 03/28/2023     03/28/2023     Lab Results   Component Value Date    LABA1C 5.4 09/02/2021     Lab Results   Component Value Date    CHOLFAST 185 10/09/2019    TRIGLYCFAST 100 10/09/2019    HDL 61 (H) 10/09/2019    HDL 59 03/21/2018    HDL 53 11/06/2012    LDLCALC 104 10/09/2019    LDLCALC 129 03/21/2018    LDLCALC 128 11/06/2012    CHOL 209 03/21/2018    CHOL 197 11/06/2012    TRIG 105 03/21/2018    TRIG 103 11/06/2012     No results found for: TESTOSTERONE, SHBG, TESTFREENM  Lab Results   Component Value Date    TSH 2.630 07/18/2023    TSHREFLEX 1.710 03/17/2023    TSHREFLEX 2.670 10/09/2019    T4FREE 0.96 07/18/2023     Lab Results   Component Value Date    TPOABS <4.0 07/18/2023 4/27/2023  FINDINGS:  PHARYNX/LARYNX:  The palatine tonsils are normal in appearance. The tongue  is normal in appearance. The valleculae, epiglottis, aryepiglottic folds and  pyriform sinuses appear unremarkable. The true and false vocal cords are  normal in appearance. No mass or abscess is seen. SALIVARY GLANDS/THYROID:  The parotid and submandibular glands appear  unremarkable. There is an enlarged multinodular right thyroid lobe.    LYMPH NODES:  No cervical

## 2023-09-26 ENCOUNTER — APPOINTMENT (OUTPATIENT)
Dept: CT IMAGING | Age: 78
End: 2023-09-26
Payer: MEDICARE

## 2023-09-26 ENCOUNTER — HOSPITAL ENCOUNTER (EMERGENCY)
Age: 78
Discharge: HOME | End: 2023-09-26
Attending: EMERGENCY MEDICINE
Payer: MEDICARE

## 2023-09-26 VITALS
TEMPERATURE: 98.2 F | HEART RATE: 73 BPM | DIASTOLIC BLOOD PRESSURE: 72 MMHG | BODY MASS INDEX: 20.57 KG/M2 | HEIGHT: 66 IN | RESPIRATION RATE: 19 BRPM | WEIGHT: 128 LBS | OXYGEN SATURATION: 96 % | SYSTOLIC BLOOD PRESSURE: 128 MMHG

## 2023-09-26 DIAGNOSIS — K59.09 OTHER CONSTIPATION: Primary | ICD-10-CM

## 2023-09-26 DIAGNOSIS — K64.8 INTERNAL HEMORRHOIDS: ICD-10-CM

## 2023-09-26 DIAGNOSIS — M43.16 ANTEROLISTHESIS OF LUMBAR SPINE: ICD-10-CM

## 2023-09-26 DIAGNOSIS — K56.41 FECAL IMPACTION IN RECTUM (HCC): ICD-10-CM

## 2023-09-26 LAB
ALBUMIN SERPL-MCNC: 4.6 G/DL (ref 3.5–4.6)
ALP SERPL-CCNC: 40 U/L (ref 40–130)
ALT SERPL-CCNC: 16 U/L (ref 0–33)
ANION GAP SERPL CALCULATED.3IONS-SCNC: 9 MEQ/L (ref 9–15)
AST SERPL-CCNC: 25 U/L (ref 0–35)
BASOPHILS # BLD: 0 K/UL (ref 0–0.2)
BASOPHILS NFR BLD: 0.2 %
BILIRUB SERPL-MCNC: 0.5 MG/DL (ref 0.2–0.7)
BUN SERPL-MCNC: 31 MG/DL (ref 8–23)
CALCIUM SERPL-MCNC: 9.1 MG/DL (ref 8.5–9.9)
CHLORIDE SERPL-SCNC: 102 MEQ/L (ref 95–107)
CO2 SERPL-SCNC: 27 MEQ/L (ref 20–31)
CREAT SERPL-MCNC: 0.58 MG/DL (ref 0.5–0.9)
CRP SERPL HS-MCNC: <3 MG/L (ref 0–5)
EOSINOPHIL # BLD: 0 K/UL (ref 0–0.7)
EOSINOPHIL NFR BLD: 0.7 %
ERYTHROCYTE [DISTWIDTH] IN BLOOD BY AUTOMATED COUNT: 12.9 % (ref 11.5–14.5)
ERYTHROCYTE [SEDIMENTATION RATE] IN BLOOD BY WESTERGREN METHOD: 9 MM (ref 0–30)
GLOBULIN SER CALC-MCNC: 2 G/DL (ref 2.3–3.5)
GLUCOSE SERPL-MCNC: 87 MG/DL (ref 70–99)
HCT VFR BLD AUTO: 36.6 % (ref 37–47)
HGB BLD-MCNC: 11.7 G/DL (ref 12–16)
LIPASE SERPL-CCNC: 35 U/L (ref 12–95)
LYMPHOCYTES # BLD: 1.9 K/UL (ref 1–4.8)
LYMPHOCYTES NFR BLD: 35 %
MCH RBC QN AUTO: 30.8 PG (ref 27–31.3)
MCHC RBC AUTO-ENTMCNC: 32 % (ref 33–37)
MCV RBC AUTO: 96.3 FL (ref 79.4–94.8)
MONOCYTES # BLD: 0.6 K/UL (ref 0.2–0.8)
MONOCYTES NFR BLD: 9.9 %
NEUTROPHILS # BLD: 3 K/UL (ref 1.4–6.5)
NEUTS SEG NFR BLD: 53.8 %
PLATELET # BLD AUTO: 230 K/UL (ref 130–400)
POTASSIUM SERPL-SCNC: 4.2 MEQ/L (ref 3.4–4.9)
PROT SERPL-MCNC: 6.6 G/DL (ref 6.3–8)
RBC # BLD AUTO: 3.8 M/UL (ref 4.2–5.4)
SODIUM SERPL-SCNC: 138 MEQ/L (ref 135–144)
WBC # BLD AUTO: 5.6 K/UL (ref 4.8–10.8)

## 2023-09-26 PROCEDURE — 83690 ASSAY OF LIPASE: CPT

## 2023-09-26 PROCEDURE — 80053 COMPREHEN METABOLIC PANEL: CPT

## 2023-09-26 PROCEDURE — 99284 EMERGENCY DEPT VISIT MOD MDM: CPT

## 2023-09-26 PROCEDURE — 86140 C-REACTIVE PROTEIN: CPT

## 2023-09-26 PROCEDURE — 72131 CT LUMBAR SPINE W/O DYE: CPT

## 2023-09-26 PROCEDURE — 74176 CT ABD & PELVIS W/O CONTRAST: CPT

## 2023-09-26 PROCEDURE — 85025 COMPLETE CBC W/AUTO DIFF WBC: CPT

## 2023-09-26 PROCEDURE — 85652 RBC SED RATE AUTOMATED: CPT

## 2023-09-26 PROCEDURE — 6370000000 HC RX 637 (ALT 250 FOR IP): Performed by: EMERGENCY MEDICINE

## 2023-09-26 PROCEDURE — 36415 COLL VENOUS BLD VENIPUNCTURE: CPT

## 2023-09-26 RX ORDER — ENEMA 19; 7 G/133ML; G/133ML
1 ENEMA RECTAL
Status: COMPLETED | OUTPATIENT
Start: 2023-09-26 | End: 2023-09-26

## 2023-09-26 RX ORDER — HYDROCORTISONE 25 MG/G
CREAM TOPICAL
Qty: 28 G | Refills: 0 | Status: SHIPPED | OUTPATIENT
Start: 2023-09-26

## 2023-09-26 RX ORDER — DOCUSATE SODIUM 100 MG/1
100 CAPSULE, LIQUID FILLED ORAL 3 TIMES DAILY PRN
Qty: 60 CAPSULE | Refills: 0 | Status: SHIPPED | OUTPATIENT
Start: 2023-09-26 | End: 2023-10-26

## 2023-09-26 RX ORDER — MINERAL OIL 100 G/100G
1 OIL RECTAL PRN
Status: DISCONTINUED | OUTPATIENT
Start: 2023-09-26 | End: 2023-09-27 | Stop reason: HOSPADM

## 2023-09-26 RX ADMIN — SODIUM PHOSPHATE, DIBASIC AND SODIUM PHOSPHATE, MONOBASIC 1 ENEMA: 7; 19 ENEMA RECTAL at 21:23

## 2023-09-26 ASSESSMENT — PAIN SCALES - GENERAL: PAINLEVEL_OUTOF10: 7

## 2023-09-26 ASSESSMENT — LIFESTYLE VARIABLES: HOW OFTEN DO YOU HAVE A DRINK CONTAINING ALCOHOL: NEVER

## 2023-09-26 ASSESSMENT — PAIN - FUNCTIONAL ASSESSMENT: PAIN_FUNCTIONAL_ASSESSMENT: 0-10

## 2023-09-27 NOTE — ED NOTES
Discharge instructions reviewed with pt. Pt verbalized understanding with no questions or concerns. Resps even, non labored. Skin p/w/d. IV removed. Pt is ambulatory - gait is steady. Pt verbalized understanding to take prescriptions to pharmacy of choice and they will fill them for her as well as to f/u with PCP.   VSS  [facility] [id number] Derick Goodman RN  09/26/23 1976

## 2023-09-27 NOTE — ED NOTES
Pt unable to liset. Soap suds enema, pt unable to hold fluids in.   Pt a&ox4, skin w/d/pink, 0 distress.      Alistair Diane RN  09/26/23 3421

## 2023-09-27 NOTE — ED TRIAGE NOTES
Pt presents to the ED via EMS with CO constipaition x 2 days. EMS states they found her face down in the bathroom - denies falling but states she felt like laying on her stomach flat will help her stomach move. Pt states she went to the ER for her back pain a few days ago and they gave her pain tramadol and tizanidine. Pt states she has normal bowel movements. Pt denies any ABD but states \"my hemorrhoids are screaming. \"  Pt is A&Ox4  VSS

## 2023-11-06 ENCOUNTER — HOSPITAL ENCOUNTER (OUTPATIENT)
Dept: WOMENS IMAGING | Age: 78
Discharge: HOME | End: 2023-11-08
Payer: MEDICARE

## 2023-11-06 DIAGNOSIS — M81.0 SENILE OSTEOPOROSIS: ICD-10-CM

## 2023-11-06 DIAGNOSIS — M81.0 AGE-RELATED OSTEOPOROSIS WITHOUT CURRENT PATHOLOGICAL FRACTURE: ICD-10-CM

## 2023-11-06 PROCEDURE — 77080 DXA BONE DENSITY AXIAL: CPT

## 2024-01-09 ENCOUNTER — HOSPITAL ENCOUNTER (OUTPATIENT)
Dept: ULTRASOUND IMAGING | Age: 79
Discharge: HOME | End: 2024-01-11
Payer: MEDICARE

## 2024-01-09 DIAGNOSIS — E04.1 THYROID NODULE: ICD-10-CM

## 2024-01-09 PROCEDURE — 76536 US EXAM OF HEAD AND NECK: CPT

## 2024-01-25 ENCOUNTER — OFFICE VISIT (OUTPATIENT)
Dept: ENDOCRINOLOGY | Age: 79
End: 2024-01-25
Payer: MEDICARE

## 2024-01-25 VITALS
WEIGHT: 128 LBS | BODY MASS INDEX: 20.66 KG/M2 | OXYGEN SATURATION: 98 % | DIASTOLIC BLOOD PRESSURE: 78 MMHG | HEART RATE: 73 BPM | SYSTOLIC BLOOD PRESSURE: 126 MMHG

## 2024-01-25 DIAGNOSIS — E04.1 THYROID NODULE: Primary | ICD-10-CM

## 2024-01-25 PROCEDURE — G8420 CALC BMI NORM PARAMETERS: HCPCS | Performed by: PHYSICIAN ASSISTANT

## 2024-01-25 PROCEDURE — 1090F PRES/ABSN URINE INCON ASSESS: CPT | Performed by: PHYSICIAN ASSISTANT

## 2024-01-25 PROCEDURE — 99213 OFFICE O/P EST LOW 20 MIN: CPT | Performed by: PHYSICIAN ASSISTANT

## 2024-01-25 PROCEDURE — G8399 PT W/DXA RESULTS DOCUMENT: HCPCS | Performed by: PHYSICIAN ASSISTANT

## 2024-01-25 PROCEDURE — 1123F ACP DISCUSS/DSCN MKR DOCD: CPT | Performed by: PHYSICIAN ASSISTANT

## 2024-01-25 PROCEDURE — 1036F TOBACCO NON-USER: CPT | Performed by: PHYSICIAN ASSISTANT

## 2024-01-25 PROCEDURE — G8427 DOCREV CUR MEDS BY ELIG CLIN: HCPCS | Performed by: PHYSICIAN ASSISTANT

## 2024-01-25 PROCEDURE — G8484 FLU IMMUNIZE NO ADMIN: HCPCS | Performed by: PHYSICIAN ASSISTANT

## 2024-01-25 ASSESSMENT — ENCOUNTER SYMPTOMS
SORE THROAT: 0
RHINORRHEA: 0
SHORTNESS OF BREATH: 0
WHEEZING: 0
NAUSEA: 0
COUGH: 0
VOMITING: 0
SINUS PRESSURE: 0
ABDOMINAL PAIN: 0
DIARRHEA: 0

## 2024-01-25 NOTE — PROGRESS NOTES
1/25/2024    Assessment:       Diagnosis Orders   1. Thyroid nodule  TSH    T4, Free        PLAN:     Thyroid US ordered in 2 years  Thyroid labs ordered in 1 year  Follow up in 1 year      Orders Placed This Encounter   Procedures    TSH     Standing Status:   Future     Standing Expiration Date:   7/25/2025    T4, Free     Standing Status:   Future     Standing Expiration Date:   7/25/2025     No orders of the defined types were placed in this encounter.    Return in about 1 year (around 1/25/2025) for Thyroid.  Subjective:     Chief Complaint   Patient presents with    Follow-up     Thyromegaly  No concerns at this time.      Vitals:    01/25/24 1034   BP: 126/78   Site: Left Upper Arm   Position: Sitting   Cuff Size: Medium Adult   Pulse: 73   SpO2: 98%   Weight: 58.1 kg (128 lb)     Wt Readings from Last 3 Encounters:   01/25/24 58.1 kg (128 lb)   09/26/23 58.1 kg (128 lb)   07/25/23 58.5 kg (129 lb)     BP Readings from Last 3 Encounters:   01/25/24 126/78   09/26/23 128/72   07/25/23 118/73     Criselda is a 77-year-old female presents today for evaluation of enlarged thyroid on CT scan of the neck.  She noticed to enlarged submandibular lymph nodes, inflamed, intermittently painful.  She had an ultrasound of those lymph nodes done and a CT of the neck.  CT showed enlarged right thyroid gland.  There is no family history of thyroid disease.  Thyroid laboratory studies were all reviewed and within normal limits.  Patient's thyroid labs are well within normal limits.  TPO is negative.  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  I discussed the case with our ENT surgeon Dr. Alex Garza.  I will repeat the ultrasound in 6 months, if this stays the same or if there is an significant change we will continue surveillance, if significant growth then we will refer her to Dr. Alex Garza for a surgical opinion.   1/25/2024-Criselda returns for follow-up visit today.  Thyroid

## 2024-02-29 DIAGNOSIS — R51.9 CHRONIC NONINTRACTABLE HEADACHE, UNSPECIFIED HEADACHE TYPE: ICD-10-CM

## 2024-02-29 DIAGNOSIS — G89.29 CHRONIC NONINTRACTABLE HEADACHE, UNSPECIFIED HEADACHE TYPE: ICD-10-CM

## 2024-02-29 RX ORDER — NAPROXEN 250 MG/1
500 TABLET ORAL EVERY 12 HOURS PRN
Qty: 180 TABLET | Refills: 1 | Status: SHIPPED | OUTPATIENT
Start: 2024-02-29

## 2024-02-29 NOTE — TELEPHONE ENCOUNTER
Future Appointments    Encounter Information   Provider Department Appt Notes   3/22/2024 Xiomy Cameron MD [facility] 1 yr f/u // sxc   3/22/2024 Xiomy Cameron MD [facility] AWV

## 2024-03-22 ENCOUNTER — OFFICE VISIT (OUTPATIENT)
Dept: FAMILY MEDICINE CLINIC | Age: 79
End: 2024-03-22

## 2024-03-22 VITALS
HEART RATE: 73 BPM | TEMPERATURE: 97 F | WEIGHT: 128 LBS | DIASTOLIC BLOOD PRESSURE: 70 MMHG | OXYGEN SATURATION: 97 % | BODY MASS INDEX: 20.57 KG/M2 | SYSTOLIC BLOOD PRESSURE: 108 MMHG | HEIGHT: 66 IN

## 2024-03-22 DIAGNOSIS — K58.0 IRRITABLE BOWEL SYNDROME WITH DIARRHEA: ICD-10-CM

## 2024-03-22 DIAGNOSIS — M81.0 SENILE OSTEOPOROSIS: ICD-10-CM

## 2024-03-22 DIAGNOSIS — Z00.00 MEDICARE ANNUAL WELLNESS VISIT, SUBSEQUENT: Primary | ICD-10-CM

## 2024-03-22 DIAGNOSIS — M79.7 FIBROMYALGIA: ICD-10-CM

## 2024-03-22 DIAGNOSIS — E67.8 HYPERVITAMINOSIS: ICD-10-CM

## 2024-03-22 DIAGNOSIS — E04.1 THYROID NODULE: ICD-10-CM

## 2024-03-22 DIAGNOSIS — R25.1 TREMOR: ICD-10-CM

## 2024-03-22 DIAGNOSIS — E78.5 DYSLIPIDEMIA: Primary | ICD-10-CM

## 2024-03-22 DIAGNOSIS — G47.00 PERSISTENT INSOMNIA: ICD-10-CM

## 2024-03-22 DIAGNOSIS — M35.01 SJOGREN'S SYNDROME WITH KERATOCONJUNCTIVITIS SICCA (HCC): ICD-10-CM

## 2024-03-22 SDOH — ECONOMIC STABILITY: FOOD INSECURITY: WITHIN THE PAST 12 MONTHS, THE FOOD YOU BOUGHT JUST DIDN'T LAST AND YOU DIDN'T HAVE MONEY TO GET MORE.: NEVER TRUE

## 2024-03-22 SDOH — ECONOMIC STABILITY: FOOD INSECURITY: WITHIN THE PAST 12 MONTHS, YOU WORRIED THAT YOUR FOOD WOULD RUN OUT BEFORE YOU GOT MONEY TO BUY MORE.: NEVER TRUE

## 2024-03-22 SDOH — ECONOMIC STABILITY: INCOME INSECURITY: HOW HARD IS IT FOR YOU TO PAY FOR THE VERY BASICS LIKE FOOD, HOUSING, MEDICAL CARE, AND HEATING?: NOT HARD AT ALL

## 2024-03-22 ASSESSMENT — PATIENT HEALTH QUESTIONNAIRE - PHQ9
1. LITTLE INTEREST OR PLEASURE IN DOING THINGS: NOT AT ALL
7. TROUBLE CONCENTRATING ON THINGS, SUCH AS READING THE NEWSPAPER OR WATCHING TELEVISION: NOT AT ALL
5. POOR APPETITE OR OVEREATING: NOT AT ALL
3. TROUBLE FALLING OR STAYING ASLEEP: NOT AT ALL
6. FEELING BAD ABOUT YOURSELF - OR THAT YOU ARE A FAILURE OR HAVE LET YOURSELF OR YOUR FAMILY DOWN: NOT AT ALL
8. MOVING OR SPEAKING SO SLOWLY THAT OTHER PEOPLE COULD HAVE NOTICED. OR THE OPPOSITE, BEING SO FIGETY OR RESTLESS THAT YOU HAVE BEEN MOVING AROUND A LOT MORE THAN USUAL: NOT AT ALL
SUM OF ALL RESPONSES TO PHQ QUESTIONS 1-9: 0
SUM OF ALL RESPONSES TO PHQ QUESTIONS 1-9: 0
9. THOUGHTS THAT YOU WOULD BE BETTER OFF DEAD, OR OF HURTING YOURSELF: NOT AT ALL
SUM OF ALL RESPONSES TO PHQ9 QUESTIONS 1 & 2: 0
2. FEELING DOWN, DEPRESSED OR HOPELESS: NOT AT ALL
10. IF YOU CHECKED OFF ANY PROBLEMS, HOW DIFFICULT HAVE THESE PROBLEMS MADE IT FOR YOU TO DO YOUR WORK, TAKE CARE OF THINGS AT HOME, OR GET ALONG WITH OTHER PEOPLE: NOT DIFFICULT AT ALL
SUM OF ALL RESPONSES TO PHQ QUESTIONS 1-9: 0
SUM OF ALL RESPONSES TO PHQ QUESTIONS 1-9: 0

## 2024-03-22 ASSESSMENT — LIFESTYLE VARIABLES
HOW OFTEN DO YOU HAVE A DRINK CONTAINING ALCOHOL: NEVER
HOW MANY STANDARD DRINKS CONTAINING ALCOHOL DO YOU HAVE ON A TYPICAL DAY: PATIENT DOES NOT DRINK

## 2024-03-22 NOTE — PROGRESS NOTES
Chief Complaint   Patient presents with    1 Year Follow Up     No issues/concerns          HPI: Criselda Tabares 78 y.o. female presenting for     Hypervitaminosis D   Patient was taking vitmain D   Managed by rheum  Noted to be elevated   Vitamin d was stopped   Needs levels rechecked.     F/u   Normal vitamin D     Sjogren   Still has dry mouth   And eye drops - is on the albumin.  - managed by ophtalmology     Thryoidmegaly   Feels like the thryoid was englarge   Admits to some pain   Has baseline tremors - familial     F/u   Sees endocrinology   Lab Results   Component Value Date    TSH 2.630 07/18/2023    TSHREFLEX 1.710 03/17/2023         Familial tremors   Patient takes primidone for tremors   Unsure if it helps     Follow-up   was seen by neurology.  Was placed on the medication but patient is unsure what it was.  Stopped the medication due to unwanted side effects (sedative properties).    Fibromyalgia  Patient is taking lexapro   Using it to banllance the cymblata for hte pain   gabapetin 300 mg TID   Take zanaflex    Insomina   Take the trazodone.        GERD   Take tne nexium   Stable at this time.  Current Outpatient Medications   Medication Sig Dispense Refill    naproxen (NAPROSYN) 250 MG tablet TAKE 2 TABLETS BY MOUTH EVERY 12 HOURS AS NEEDED FOR PAIN 180 tablet 1    hydrocortisone (ANUSOL-HC) 2.5 % CREA rectal cream Use for hemorrhoids 28 g 0    escitalopram (LEXAPRO) 20 MG tablet       NEXIUM 40 MG delayed release capsule TAKE 1 CAPSULE BY MOUTH DAILY. Brand name only. 90 capsule 3    primidone (MYSOLINE) 50 MG tablet Take 1 tablet by mouth nightly      hydrocortisone (ANUSOL-HC) 2.5 % CREA rectal cream Rub in twice a day as needed for itch and burn 1 Tube 3    gabapentin (NEURONTIN) 300 MG capsule TAKE ONE CAPSULE BY MOUTH 3 TIMES A DAY  5    tiZANidine (ZANAFLEX) 4 MG tablet Take 1 tablet by mouth daily      pilocarpine (SALAGEN) 5 MG tablet Take 1 tablet by mouth nightly      traMADol (ULTRAM)

## 2024-03-22 NOTE — PROGRESS NOTES
capsule TAKE 1 CAPSULE BY MOUTH DAILY. Brand name only.  Xiomy Cameron MD   primidone (MYSOLINE) 50 MG tablet Take 1 tablet by mouth nightly  ProviderMadalyn MD   hydrocortisone (ANUSOL-HC) 2.5 % CREA rectal cream Rub in twice a day as needed for itch and burn  Marva Oconnor MD   gabapentin (NEURONTIN) 300 MG capsule TAKE ONE CAPSULE BY MOUTH 3 TIMES A DAY  Madalyn Metzger MD   tiZANidine (ZANAFLEX) 4 MG tablet Take 1 tablet by mouth daily  Madalyn Metzger MD   Cholecalciferol (VITAMIN D3) 5000 UNITS CAPS Take 2 capsules by mouth daily  Patient not taking: Reported on 1/25/2024  Madalyn Metzger MD   pilocarpine (SALAGEN) 5 MG tablet Take 1 tablet by mouth nightly  Madalyn Metzger MD   traMADol (ULTRAM) 50 MG tablet Take 1 tablet by mouth daily as needed.  ProviderMadalyn MD   traZODone (DESYREL) 50 MG tablet TAKE 1 TABLET BY MOUTH EVERY DAY AT NIGHT  Marva Oconnor MD   Multiple Vitamin (MULTIVITAMIN PO) Take  by mouth daily.    ProviderMadalyn MD   Biotin 1000 MCG TABS Take 1,000 mg by mouth daily.    ProviderMadalyn MD   B COMPLEX-C PO Take  by mouth daily.    ProviderMadalyn MD   fish oil-omega-3 fatty acids 1000 MG capsule Take 2 capsules by mouth daily  Madalyn Metzger MD   Potassium 99 MG TABS Take 1 tablet by mouth daily  ProviderMadalyn MD       CareTeam (Including outside providers/suppliers regularly involved in providing care):   Patient Care Team:  Xiomy Cameron MD as PCP - General (Family Medicine)  Xiomy Cameron MD as PCP - Empaneled Provider  Juan Alberto Do MD (Gastroenterology)  Alicia Miller MD (Internal Medicine)  Jazmin Kitchen (Optometry)  Will Diaz MD (Dermatology)     Reviewed and updated this visit:

## 2024-09-19 ENCOUNTER — TELEPHONE (OUTPATIENT)
Dept: FAMILY MEDICINE CLINIC | Age: 79
End: 2024-09-19

## 2024-09-19 DIAGNOSIS — G89.29 CHRONIC NONINTRACTABLE HEADACHE, UNSPECIFIED HEADACHE TYPE: ICD-10-CM

## 2024-09-19 DIAGNOSIS — R51.9 CHRONIC NONINTRACTABLE HEADACHE, UNSPECIFIED HEADACHE TYPE: ICD-10-CM

## 2024-09-19 RX ORDER — NAPROXEN 250 MG/1
500 TABLET ORAL EVERY 12 HOURS PRN
Qty: 180 TABLET | Refills: 1 | Status: SHIPPED | OUTPATIENT
Start: 2024-09-19

## 2024-10-26 ENCOUNTER — HOSPITAL ENCOUNTER (EMERGENCY)
Age: 79
Discharge: HOME | End: 2024-10-26
Payer: MEDICARE

## 2024-10-26 VITALS
OXYGEN SATURATION: 97 % | HEIGHT: 66 IN | RESPIRATION RATE: 20 BRPM | BODY MASS INDEX: 20.57 KG/M2 | HEART RATE: 75 BPM | SYSTOLIC BLOOD PRESSURE: 117 MMHG | WEIGHT: 128 LBS | DIASTOLIC BLOOD PRESSURE: 81 MMHG | TEMPERATURE: 97.9 F

## 2024-10-26 DIAGNOSIS — S91.109A AVULSION OF SKIN OF TOE, INITIAL ENCOUNTER: Primary | ICD-10-CM

## 2024-10-26 PROCEDURE — 90471 IMMUNIZATION ADMIN: CPT

## 2024-10-26 PROCEDURE — 90714 TD VACC NO PRESV 7 YRS+ IM: CPT

## 2024-10-26 PROCEDURE — 6360000002 HC RX W HCPCS

## 2024-10-26 PROCEDURE — 12001 RPR S/N/AX/GEN/TRNK 2.5CM/<: CPT

## 2024-10-26 PROCEDURE — 99284 EMERGENCY DEPT VISIT MOD MDM: CPT

## 2024-10-26 RX ADMIN — CLOSTRIDIUM TETANI TOXOID ANTIGEN (FORMALDEHYDE INACTIVATED) AND CORYNEBACTERIUM DIPHTHERIAE TOXOID ANTIGEN (FORMALDEHYDE INACTIVATED) 0.5 ML: 5; 2 INJECTION, SUSPENSION INTRAMUSCULAR at 00:48

## 2024-10-26 ASSESSMENT — LIFESTYLE VARIABLES
HOW MANY STANDARD DRINKS CONTAINING ALCOHOL DO YOU HAVE ON A TYPICAL DAY: 1 OR 2
HOW OFTEN DO YOU HAVE A DRINK CONTAINING ALCOHOL: MONTHLY OR LESS

## 2024-10-26 NOTE — ED TRIAGE NOTES
Patient was cutting her toe nails and clipped the skin. Patient's left big toe won't stop bleeding.

## 2024-10-26 NOTE — ED PROVIDER NOTES
[facility] ED  EMERGENCY DEPARTMENT ENCOUNTER      Pt Name: Criselda Tabares  MRN: [id number]  Birthdate 1945  Date of evaluation: 10/26/2024  Provider: ROWENA Hopkins  12:10 AM EDT    CHIEF COMPLAINT       Chief Complaint   Patient presents with    Toe Injury     Patient was cutting her toe nails and clipped the skin. Patient's left big toe won't stop bleeding.          HISTORY OF PRESENT ILLNESS   (Location/Symptom, Timing/Onset, Context/Setting, Quality, Duration, Modifying Factors, Severity)  Note limiting factors.   Criselda Tabares is a 79 y.o. female whom per chart review has a PMHx of fibromyalgia, IBS, GERD, Sjogren's, depression presents to ED for evaluation a wound check. Patient reports that she was cutting her toenails and states that she accidentally cut the skin of her L great toe. Patient states that she has been unable to manage the bleeding since the injury occurred. Patient denies anticoagulation. No additional complaints verbalized.     HPI    Nursing Notes were reviewed.    REVIEW OF SYSTEMS    (2-9 systems for level 4, 10 or more for level 5)     Review of Systems   Skin:  Positive for wound.   All other systems reviewed and are negative.      Except as noted above the remainder of the review of systems was reviewed and negative.       PAST MEDICAL HISTORY     Past Medical History:   Diagnosis Date    Fibromyalgia     GERD (gastroesophageal reflux disease)     GERD    Irritable bowel syndrome     Senile osteoporosis          SURGICAL HISTORY       Past Surgical History:   Procedure Laterality Date    COLONOSCOPY  2010    Dr. Pak    HAND SURGERY Right     right thumb    HYSTERECTOMY (CERVIX STATUS UNKNOWN)  2008    total    JOINT REPLACEMENT Right 12/2016    KNEE ARTHROSCOPY      both knees-meniscal injury and wear    TONSILLECTOMY      US THYROID BIOPSY  6/29/2023    US THYROID BIOPSY 6/29/2023 Dank Santos MD [facility]         CURRENT MEDICATIONS

## 2024-11-07 DIAGNOSIS — G89.29 CHRONIC NONINTRACTABLE HEADACHE, UNSPECIFIED HEADACHE TYPE: ICD-10-CM

## 2024-11-07 DIAGNOSIS — R51.9 CHRONIC NONINTRACTABLE HEADACHE, UNSPECIFIED HEADACHE TYPE: ICD-10-CM

## 2024-11-07 RX ORDER — NAPROXEN 250 MG/1
500 TABLET ORAL EVERY 12 HOURS PRN
Qty: 180 TABLET | Refills: 1 | Status: SHIPPED | OUTPATIENT
Start: 2024-11-07

## 2024-11-07 NOTE — TELEPHONE ENCOUNTER
MEDICATION REFILL REQUEST     Rx Requested    Requested Prescriptions     Pending Prescriptions Disp Refills    naproxen (NAPROSYN) 250 MG tablet 180 tablet 1     Sig: Take 2 tablets by mouth every 12 hours as needed for Pain         Patient's Last Office Visit   3/22/2024      Patient's Next Office Visit   Future Appointments   Date Time Provider Department Center   1/23/2025 10:45 AM Cardona, Cezar S, PA [place] [facility]   3/25/2025 10:00 AM [facility]   3/25/2025 10:30 AM Xiomy Cameron MD [facility]         Other comments

## 2024-11-26 ENCOUNTER — TELEMEDICINE (OUTPATIENT)
Dept: FAMILY MEDICINE CLINIC | Age: 79
End: 2024-11-26

## 2024-11-26 ENCOUNTER — TELEPHONE (OUTPATIENT)
Dept: FAMILY MEDICINE CLINIC | Age: 79
End: 2024-11-26

## 2024-11-26 DIAGNOSIS — J01.90 ACUTE SINUSITIS, RECURRENCE NOT SPECIFIED, UNSPECIFIED LOCATION: Primary | ICD-10-CM

## 2024-11-26 RX ORDER — METHYLPREDNISOLONE 4 MG/1
TABLET ORAL
Qty: 1 KIT | Refills: 0 | Status: SHIPPED | OUTPATIENT
Start: 2024-11-26 | End: 2024-12-02

## 2024-11-26 RX ORDER — AZITHROMYCIN 250 MG/1
TABLET, FILM COATED ORAL
Qty: 6 TABLET | Refills: 0 | Status: SHIPPED | OUTPATIENT
Start: 2024-11-26 | End: 2024-11-26

## 2024-11-26 RX ORDER — METHYLPREDNISOLONE 4 MG/1
TABLET ORAL
Qty: 1 KIT | Refills: 0 | Status: SHIPPED | OUTPATIENT
Start: 2024-11-26 | End: 2024-11-26

## 2024-11-26 RX ORDER — AZITHROMYCIN 250 MG/1
TABLET, FILM COATED ORAL
Qty: 6 TABLET | Refills: 0 | Status: SHIPPED | OUTPATIENT
Start: 2024-11-26 | End: 2024-12-06

## 2024-11-26 NOTE — PROGRESS NOTES
2024    TELEHEALTH EVALUATION -- Audio/Visual (During COVID-19 public health emergency)    Due to COVID 19 outbreak, patient's office visit was converted to a virtual visit.  Patient was contacted and agreed to proceed with a virtual visit via Telephone Visit  The risks and benefits of converting to a virtual visit were discussed in light of the current infectious disease epidemic.  Patient also understood that insurance coverage and co-pays are up to their individual insurance plans.    HPI:    Criselda CASANOVA Raysa (:  1945) has requested an audio/video evaluation for the following concern(s):    Head congestion  Patient coming with a chief complaint of a cold and nose plugging.  Patient been taking Claritin-D which seems to help.  Associated with headaches but no fevers.  Patient reports has been going on for the last couple days.  Admits to a sick contact and coughing.  Denies any wheezing.    ROS  Per HPI  Prior to Visit Medications    Medication Sig Taking? Authorizing Provider   azithromycin (ZITHROMAX) 250 MG tablet 500mg on day 1 followed by 250mg on days 2 - 5 Yes Xiomy Cameron MD   methylPREDNISolone (MEDROL DOSEPACK) 4 MG tablet Take by mouth. Yes Xiomy Cameron MD   naproxen (NAPROSYN) 250 MG tablet Take 2 tablets by mouth every 12 hours as needed for Pain Yes Xiomy Cameron MD   hydrocortisone (ANUSOL-HC) 2.5 % CREA rectal cream Use for hemorrhoids Yes Ramos Barrera MD   escitalopram (LEXAPRO) 20 MG tablet  Yes Madalyn Metzger MD   NEXIUM 40 MG delayed release capsule TAKE 1 CAPSULE BY MOUTH DAILY. Brand name only. Yes Xiomy Cameron MD   primidone (MYSOLINE) 50 MG tablet Take 1 tablet by mouth nightly Yes Madalyn Metzger MD   hydrocortisone (ANUSOL-HC) 2.5 % CREA rectal cream Rub in twice a day as needed for itch and burn Yes Marva Oconnor MD   gabapentin (NEURONTIN) 300 MG capsule TAKE ONE CAPSULE BY MOUTH 3 TIMES A DAY Yes Madalyn Metzger MD

## 2024-11-26 NOTE — TELEPHONE ENCOUNTER
Patient states she currently has cold symptoms (cough & sneezing) She also has nasal congestion, but it only occurs at night when she lays down which is making it very difficult for her to sleep.    She is inquiring if there is anything her pcp would be willing to prescribe. She has tried Claritin-D, which does not help.    Offered the walk-in and/or to assist w/ scheduling an appt but patient declined both.

## 2025-01-30 ENCOUNTER — OFFICE VISIT (OUTPATIENT)
Dept: ENDOCRINOLOGY | Age: 80
End: 2025-01-30
Payer: MEDICARE

## 2025-01-30 VITALS
DIASTOLIC BLOOD PRESSURE: 74 MMHG | BODY MASS INDEX: 20.57 KG/M2 | WEIGHT: 128 LBS | HEIGHT: 66 IN | SYSTOLIC BLOOD PRESSURE: 111 MMHG | HEART RATE: 76 BPM

## 2025-01-30 DIAGNOSIS — E04.1 THYROID NODULE: ICD-10-CM

## 2025-01-30 DIAGNOSIS — E04.1 THYROID NODULE: Primary | ICD-10-CM

## 2025-01-30 DIAGNOSIS — M51.362 DEGENERATION OF INTERVERTEBRAL DISC OF LUMBAR REGION WITH DISCOGENIC BACK PAIN AND LOWER EXTREMITY PAIN: ICD-10-CM

## 2025-01-30 LAB
ALBUMIN SERPL-MCNC: 4.4 G/DL (ref 3.5–4.6)
ALP SERPL-CCNC: 43 U/L (ref 40–130)
ALT SERPL-CCNC: 14 U/L (ref 0–33)
ANION GAP SERPL CALCULATED.3IONS-SCNC: 10 MEQ/L (ref 9–15)
AST SERPL-CCNC: 23 U/L (ref 0–35)
BILIRUB SERPL-MCNC: 0.6 MG/DL (ref 0.2–0.7)
BUN SERPL-MCNC: 33 MG/DL (ref 8–23)
CALCIUM SERPL-MCNC: 9.1 MG/DL (ref 8.5–9.9)
CHLORIDE SERPL-SCNC: 101 MEQ/L (ref 95–107)
CO2 SERPL-SCNC: 27 MEQ/L (ref 20–31)
CREAT SERPL-MCNC: 0.57 MG/DL (ref 0.5–0.9)
GLOBULIN SER CALC-MCNC: 2.2 G/DL (ref 2.3–3.5)
GLUCOSE SERPL-MCNC: 89 MG/DL (ref 70–99)
POTASSIUM SERPL-SCNC: 4.2 MEQ/L (ref 3.4–4.9)
PROT SERPL-MCNC: 6.6 G/DL (ref 6.3–8)
SODIUM SERPL-SCNC: 138 MEQ/L (ref 135–144)
T4 FREE SERPL-MCNC: 1.03 NG/DL (ref 0.84–1.68)
TSH REFLEX: 2 UIU/ML (ref 0.44–3.86)

## 2025-01-30 PROCEDURE — 1159F MED LIST DOCD IN RCRD: CPT | Performed by: PHYSICIAN ASSISTANT

## 2025-01-30 PROCEDURE — G8399 PT W/DXA RESULTS DOCUMENT: HCPCS | Performed by: PHYSICIAN ASSISTANT

## 2025-01-30 PROCEDURE — 1036F TOBACCO NON-USER: CPT | Performed by: PHYSICIAN ASSISTANT

## 2025-01-30 PROCEDURE — G8420 CALC BMI NORM PARAMETERS: HCPCS | Performed by: PHYSICIAN ASSISTANT

## 2025-01-30 PROCEDURE — 99214 OFFICE O/P EST MOD 30 MIN: CPT | Performed by: PHYSICIAN ASSISTANT

## 2025-01-30 PROCEDURE — G8427 DOCREV CUR MEDS BY ELIG CLIN: HCPCS | Performed by: PHYSICIAN ASSISTANT

## 2025-01-30 PROCEDURE — 1090F PRES/ABSN URINE INCON ASSESS: CPT | Performed by: PHYSICIAN ASSISTANT

## 2025-01-30 PROCEDURE — 1123F ACP DISCUSS/DSCN MKR DOCD: CPT | Performed by: PHYSICIAN ASSISTANT

## 2025-01-30 ASSESSMENT — ENCOUNTER SYMPTOMS
VOMITING: 0
RHINORRHEA: 0
SINUS PRESSURE: 0
COUGH: 0
DIARRHEA: 0
NAUSEA: 0
WHEEZING: 0
SORE THROAT: 0
SHORTNESS OF BREATH: 0
ABDOMINAL PAIN: 0

## 2025-01-30 NOTE — PROGRESS NOTES
1/30/2025    Assessment:       Diagnosis Orders   1. Thyroid nodule  Comprehensive Metabolic Panel    TSH reflex to FT4    T4, Free    US HEAD NECK SOFT TISSUE    Thyroid Peroxidase Antibody    Thyrotropin Receptor Antibody      2. Degeneration of intervertebral disc of lumbar region with discogenic back pain and lower extremity pain  Ambulatory referral to Orthopedic Surgery        PLAN:     Thyroid US ordered   Repeat labs today, will make further recommendations based on those results.  Follow up in 1 year      Orders Placed This Encounter   Procedures    US HEAD NECK SOFT TISSUE     Standing Status:   Future     Standing Expiration Date:   1/30/2026    Comprehensive Metabolic Panel     Standing Status:   Future     Standing Expiration Date:   1/30/2026    TSH reflex to FT4     Standing Status:   Future     Standing Expiration Date:   1/30/2026    T4, Free     Standing Status:   Future     Standing Expiration Date:   1/30/2026    Thyroid Peroxidase Antibody     Standing Status:   Future     Standing Expiration Date:   1/30/2026    Thyrotropin Receptor Antibody     Standing Status:   Future     Standing Expiration Date:   1/30/2026    Ambulatory referral to Orthopedic Surgery     Referral Priority:   Routine     Referral Type:   Surgical     Referral Reason:   Specialty Services Required     Referred to Provider:   Will Clayton DO     Requested Specialty:   Orthopaedic Surgery     Number of Visits Requested:   1     No orders of the defined types were placed in this encounter.    No follow-ups on file.  Subjective:     Chief Complaint   Patient presents with    Other     Thyroid nodule      Vitals:    01/30/25 1117   BP: 111/74   Site: Right Upper Arm   Position: Sitting   Cuff Size: Medium Adult   Pulse: 76   Weight: 58.1 kg (128 lb)   Height: 1.676 m (5' 6\")       Wt Readings from Last 3 Encounters:   01/30/25 58.1 kg (128 lb)   10/26/24 58.1 kg (128 lb)   03/22/24 58.1 kg (128 lb)     BP Readings from Last 3

## 2025-02-01 LAB — TSH RECEP AB SER-ACNC: <1.1 IU/L

## 2025-02-04 LAB — THYROPEROXIDASE IGG SERPL-ACNC: <4 IU/ML (ref 0–25)

## 2025-02-13 ENCOUNTER — HOSPITAL ENCOUNTER (OUTPATIENT)
Dept: GENERAL RADIOLOGY | Age: 80
Discharge: HOME | End: 2025-02-15
Attending: ORTHOPAEDIC SURGERY
Payer: MEDICARE

## 2025-02-13 DIAGNOSIS — M25.552 LEFT HIP PAIN: ICD-10-CM

## 2025-02-13 DIAGNOSIS — M54.50 LOW BACK PAIN, UNSPECIFIED BACK PAIN LATERALITY, UNSPECIFIED CHRONICITY, UNSPECIFIED WHETHER SCIATICA PRESENT: ICD-10-CM

## 2025-02-13 DIAGNOSIS — M25.551 RIGHT HIP PAIN: ICD-10-CM

## 2025-02-13 DIAGNOSIS — M25.552 LEFT HIP PAIN: Primary | ICD-10-CM

## 2025-02-13 DIAGNOSIS — M25.551 RIGHT HIP PAIN: Primary | ICD-10-CM

## 2025-02-13 PROCEDURE — 73502 X-RAY EXAM HIP UNI 2-3 VIEWS: CPT

## 2025-02-13 PROCEDURE — 72110 X-RAY EXAM L-2 SPINE 4/>VWS: CPT

## 2025-02-26 ENCOUNTER — OFFICE VISIT (OUTPATIENT)
Dept: ORTHOPEDIC SURGERY | Age: 80
End: 2025-02-26
Payer: MEDICARE

## 2025-02-26 VITALS
TEMPERATURE: 97.6 F | RESPIRATION RATE: 18 BRPM | WEIGHT: 128 LBS | BODY MASS INDEX: 20.57 KG/M2 | HEART RATE: 86 BPM | OXYGEN SATURATION: 94 % | HEIGHT: 66 IN

## 2025-02-26 DIAGNOSIS — M54.50 LOW BACK PAIN, UNSPECIFIED BACK PAIN LATERALITY, UNSPECIFIED CHRONICITY, UNSPECIFIED WHETHER SCIATICA PRESENT: Primary | ICD-10-CM

## 2025-02-26 PROCEDURE — 99204 OFFICE O/P NEW MOD 45 MIN: CPT | Performed by: ORTHOPAEDIC SURGERY

## 2025-02-26 PROCEDURE — G8427 DOCREV CUR MEDS BY ELIG CLIN: HCPCS | Performed by: ORTHOPAEDIC SURGERY

## 2025-02-26 PROCEDURE — 1036F TOBACCO NON-USER: CPT | Performed by: ORTHOPAEDIC SURGERY

## 2025-02-26 PROCEDURE — 1159F MED LIST DOCD IN RCRD: CPT | Performed by: ORTHOPAEDIC SURGERY

## 2025-02-26 PROCEDURE — G8420 CALC BMI NORM PARAMETERS: HCPCS | Performed by: ORTHOPAEDIC SURGERY

## 2025-02-26 PROCEDURE — 1123F ACP DISCUSS/DSCN MKR DOCD: CPT | Performed by: ORTHOPAEDIC SURGERY

## 2025-02-26 PROCEDURE — 1090F PRES/ABSN URINE INCON ASSESS: CPT | Performed by: ORTHOPAEDIC SURGERY

## 2025-02-26 PROCEDURE — G8399 PT W/DXA RESULTS DOCUMENT: HCPCS | Performed by: ORTHOPAEDIC SURGERY

## 2025-02-26 NOTE — PROGRESS NOTES
Subjective:      Patient ID: Criselda Tabares is a 79 y.o. female who presents today for:  Chief Complaint   Patient presents with    Back Problem     Pt is present Degeneration of intervertebral disc of lumbar region with discogenic back pain and lower extremity pain  Symptoms:dull ache,   Onset: 6 month   PT?: no   Injections?: (if yes, include date) no  Other treatments:  ablasion   Prior Surgery?: (if yes, include date) no   Diabetic?: (if yes, include last A1C) no  Smoker?: no  # of Alcoholic Drinks/Day: Once week   Taking blood thinners?: no   Referred by: Dr Cezar Chung            Subjective/Objective/Assessment/Plan:     SUBJECTIVE -low back pain with bilateral leg radiculopathy.  She states has been ongoing for roughly a year.    OBJECTIVE - The patient can rise up on their toes and rise up on her heels.  5 out of 5 hip flexion and knee extension strength bilaterally.  Sensation intact bilaterally in the lower extremities from L2-S1.      ASSESSMENT    Diagnosis Orders   1. Low back pain, unspecified back pain laterality, unspecified chronicity, unspecified whether sciatica present  MRI LUMBAR SPINE WO CONTRAST    Ambulatory referral to Physical Therapy    CT LUMBAR SPINE WO CONTRAST          PLAN -I reviewed her vitamin D level which was 56.9.  She needs an MRI and a CT scan of her lumbar spine.  Will get her physical therapy.  I will see her back after her imaging studies have been done.  She is osteoporotic with a T-score of -2.6.  She is on Prolia.      XR LUMBAR SPINE (MIN 4 VIEWS)  Narrative: EXAMINATION:  XRAY VIEWS OF THE LUMBAR SPINE    2/13/2025 11:02 am    COMPARISON:  October 8, 2019 1416 hours    HISTORY:  ORDERING SYSTEM PROVIDED HISTORY: Low back pain, unspecified back pain  laterality, unspecified chronicity, unspecified whether sciatica present  TECHNOLOGIST PROVIDED HISTORY:  Reason for exam:->Low back pain  What reading provider will be dictating this exam?->CRC    FINDINGS:  7 views

## 2025-03-05 ENCOUNTER — HOSPITAL ENCOUNTER (OUTPATIENT)
Dept: PHYSICAL THERAPY | Age: 80
Setting detail: THERAPIES SERIES
Discharge: HOME | End: 2025-03-05
Payer: MEDICARE

## 2025-03-05 PROCEDURE — 97161 PT EVAL LOW COMPLEX 20 MIN: CPT

## 2025-03-05 NOTE — THERAPY EVALUATION
plan of care.  Yes  [x]  No  []   Explain:     Favio Fall Risk Assessment  Risk Factor Scale  Score   History of Falls [] Yes  [x] No 25  0 0   Secondary Diagnosis [x] Yes  [] No 15  0 15   Ambulatory Aid [] Furniture  [] Crutches/cane/walker  [x] None/bedrest/wheelchair/nurse 30  15  0 0   IV/Heparin Lock [] Yes  [x] No 20  0 0   Gait/Transferring [] Impaired  [] Weak  [x] Normal/bedrest/immobile 20  10  0 0   Mental Status [] Forgets limitations  [x] Oriented to own ability 15  0 0      Total:15     Based on the Assessment score: check the appropriate box.  [x]  No intervention needed   Low =   Score of 0-24  []  Use standard prevention interventions Moderate =  Score of 24-44   [] Discuss fall prevention strategies   [] Indicate moderate falls risk on eval  []  Use high risk prevention interventions High = Score of 45 and higher   [] Discuss fall prevention strategies   [] Provide supervision during treatment time    Minutes  PT Individual Minutes  Time In: 1040  Time Out: 1109  Minutes: 29  Timed Code Treatment Minutes: 0 Minutes  Procedure Minutes: 29 min evaluation    Electronically signed by Yolanda Gaffney PT on 3/5/25 at 10:26 AM EST       Please sign Physician's Certification and return to:   [place] [id number]  Dept: [phone]  Dept Fax: [phone]  Loc: [phone]    Physician's Certification / Comments     Statement of Medical Necessity: Physical Therapy is both indicated and medically necessary as outlined in the POC to increase the likelihood of meeting the functionally related goals stated above.     Patient to be seen 1-2 times per week for 4-6 weeks  Certification period from 3/5/2025  to      If you have any questions or concerns, please don't hesitate to call.  Thank you for your referral.

## 2025-03-11 ENCOUNTER — HOSPITAL ENCOUNTER (OUTPATIENT)
Dept: PHYSICAL THERAPY | Age: 80
Setting detail: THERAPIES SERIES
Discharge: HOME | End: 2025-03-11
Payer: MEDICARE

## 2025-03-11 PROCEDURE — 97110 THERAPEUTIC EXERCISES: CPT

## 2025-03-11 NOTE — PROGRESS NOTES
[facility]  Outpatient Physical Therapy   Treatment Note        Date: 3/11/2025  Patient: Criselda Tabares  : 1945   Confirmed: Yes  MRN: [id number]  Referring Provider: Will Clayton DO      Medical Diagnosis: Low back pain, unspecified back pain laterality, unspecified chronicity, unspecified whether sciatica present [M54.50]      Treatment Diagnosis: Low back pain, L hip pain, LE strength    Visit Information:  Insurance: Payor: HU[employer]NA MEDICARE / Plan: HU[employer]NA GOLD PLUS HMO / Product Type: *No Product type* /   PT Visit Information  PT Insurance Information: [employer]  Total # of Visits Approved: 10  Total # of Visits to Date: 1  Plan of Care/Certification Expiration Date: 25  No Show: 0  Progress Note Due Date: 25  Canceled Appointment: 0  Progress Note Counter: 1/10    Subjective Information:  Subjective: Reports she was doing yoga and it helped to stretch her back. States she did walk her dogs yesterday and was unsure if she was going to be able to make it back home. Getting imaging done next week.  HEP Compliance:  [x] Good [] Fair [] Poor [] Reports not doing due to:    Pain Screening  Patient Currently in Pain: Denies    Treatment:  Exercises:  Exercises  Exercise 1: hamstr with stool 3 reps x20\" holds, raul  Exercise 2: mod bandar 3 reps x 20\" holds, raul  Exercise 3: bridge x15 reps  Exercise 4: SLR x10 reps, raul  Exercise 5: seated piriformis str 3 reps x20 sec holds  Exercise 6: 4\" F/L step-ups x10 reps, ea  Exercise 7: STS from chair 2 sets x 10 reps  Exercise 8: H/L hip: hip abd RTB x15 reps ; march RTB x15 reps ; add with ball 15 reps x5 sec holds  Exercise 9: LTR 10 reps x 5 sec holds  Exercise 10: 3-way pball rolls x10 reps x 3 sec holds  Exercise 20: HEP: cont current + H/L hip, SLR, LTR    *Indicates exercise, modality, or manual techniques to be initiated when appropriate    Objective Measures: NT      Assessment:   Body Structures, Functions, Activity Limitations

## 2025-03-12 ENCOUNTER — HOSPITAL ENCOUNTER (OUTPATIENT)
Dept: MRI IMAGING | Age: 80
Discharge: HOME | End: 2025-03-14
Attending: ORTHOPAEDIC SURGERY
Payer: MEDICARE

## 2025-03-12 ENCOUNTER — HOSPITAL ENCOUNTER (OUTPATIENT)
Dept: CT IMAGING | Age: 80
Discharge: HOME | End: 2025-03-14
Attending: ORTHOPAEDIC SURGERY
Payer: MEDICARE

## 2025-03-12 DIAGNOSIS — M54.50 LOW BACK PAIN, UNSPECIFIED BACK PAIN LATERALITY, UNSPECIFIED CHRONICITY, UNSPECIFIED WHETHER SCIATICA PRESENT: ICD-10-CM

## 2025-03-12 PROCEDURE — 72148 MRI LUMBAR SPINE W/O DYE: CPT

## 2025-03-12 PROCEDURE — 72131 CT LUMBAR SPINE W/O DYE: CPT

## 2025-03-14 ENCOUNTER — HOSPITAL ENCOUNTER (OUTPATIENT)
Dept: PHYSICAL THERAPY | Age: 80
Setting detail: THERAPIES SERIES
Discharge: HOME | End: 2025-03-14
Payer: MEDICARE

## 2025-03-14 PROCEDURE — 97110 THERAPEUTIC EXERCISES: CPT

## 2025-03-14 ASSESSMENT — PAIN SCALES - GENERAL: PAINLEVEL_OUTOF10: 5

## 2025-03-14 ASSESSMENT — PAIN DESCRIPTION - LOCATION: LOCATION: HIP

## 2025-03-14 ASSESSMENT — PAIN DESCRIPTION - ORIENTATION: ORIENTATION: LEFT

## 2025-03-14 ASSESSMENT — PAIN DESCRIPTION - DESCRIPTORS: DESCRIPTORS: ACHING

## 2025-03-14 NOTE — PROGRESS NOTES
[facility]  Outpatient Physical Therapy   Treatment Note        Date: 3/14/2025  Patient: Criselda Tabares  : 1945   Confirmed: Yes  MRN: [id number]  Referring Provider: Will Clayton DO      Medical Diagnosis: Low back pain, unspecified back pain laterality, unspecified chronicity, unspecified whether sciatica present [M54.50]      Treatment Diagnosis: Low back pain, L hip pain, LE strength    Visit Information:  Insurance: Payor: HUMANA MEDICARE / Plan: HUMANA GOLD PLUS HMO / Product Type: *No Product type* /   PT Visit Information  PT Insurance Information: [employer] Medicare  Total # of Visits Approved: 10  Total # of Visits to Date: 2  Plan of Care/Certification Expiration Date: 25  No Show: 0  Progress Note Due Date: 25  Canceled Appointment: 0  Progress Note Counter: 2/10    Subjective Information:  Subjective: Pt reported she was sore after last therapy session, however after a nap she felt better. Pt had her imaging done but does not have results currently.  HEP Compliance:  [x] Good [] Fair [] Poor [] Reports not doing due to:               Pain Screening  Patient Currently in Pain: Yes  Pain Assessment: 0-10  Pain Level: 5  Pain Location: Hip  Pain Orientation: Left  Pain Descriptors: Aching    Treatment:  Exercises:  Exercises  Exercise 1: hamstr with stool 3 reps x20\" holds, raul  Exercise 2: mod bandar 3 reps x 20\" holds, raul  Exercise 3: bridge x15 reps  Exercise 4: SLR x10 reps, raul  Exercise 5: seated piriformis str 3 reps x20 sec holds  Exercise 6: 4\" F/L step-ups x10 reps, ea  Exercise 7: STS from chair 2 sets x 10 reps  Exercise 8: H/L hip: hip abd RTB x15 reps ; march RTB x15 reps ; add with ball 15 reps x5 sec holds  Exercise 9: LTR 10 reps x 5 sec holds  Exercise 10: 3-way pball rolls x10 reps x 3 sec holds  Exercise 20: HEP: cont current + H/L hip, SLR, LTR       *Indicates exercise, modality, or manual techniques to be initiated when appropriate    Objective Measures:

## 2025-03-19 ENCOUNTER — HOSPITAL ENCOUNTER (OUTPATIENT)
Dept: PHYSICAL THERAPY | Age: 80
Setting detail: THERAPIES SERIES
Discharge: HOME | End: 2025-03-19
Payer: MEDICARE

## 2025-03-19 PROCEDURE — 97110 THERAPEUTIC EXERCISES: CPT

## 2025-03-19 ASSESSMENT — PAIN DESCRIPTION - ORIENTATION: ORIENTATION: LEFT

## 2025-03-19 ASSESSMENT — PAIN DESCRIPTION - LOCATION: LOCATION: HIP;LEG;SHOULDER

## 2025-03-19 ASSESSMENT — PAIN DESCRIPTION - DESCRIPTORS: DESCRIPTORS: ACHING;SHOOTING

## 2025-03-19 ASSESSMENT — PAIN SCALES - GENERAL: PAINLEVEL_OUTOF10: 7

## 2025-03-19 NOTE — PROGRESS NOTES
section for any therapeutic exercise changes, additions or modifications this date.    Therapy Time:   PT Individual Minutes  Time In: 1046  Time Out: 1127  Minutes: 41  Timed Code Treatment Minutes: 36 Minutes  Procedure Minutes: 5    Timed Activity Minutes Units   Ther Ex 36 2     Electronically signed by Yolanda Gaffney, PT on 3/19/25 at 12:44 PM EDT

## 2025-03-21 ENCOUNTER — HOSPITAL ENCOUNTER (OUTPATIENT)
Dept: PHYSICAL THERAPY | Age: 80
Setting detail: THERAPIES SERIES
Discharge: HOME | End: 2025-03-21
Payer: MEDICARE

## 2025-03-21 PROCEDURE — 97110 THERAPEUTIC EXERCISES: CPT

## 2025-03-21 ASSESSMENT — PAIN DESCRIPTION - LOCATION: LOCATION: LEG;HIP;SHOULDER

## 2025-03-21 ASSESSMENT — PAIN SCALES - GENERAL: PAINLEVEL_OUTOF10: 5

## 2025-03-21 ASSESSMENT — PAIN DESCRIPTION - DESCRIPTORS: DESCRIPTORS: ACHING;SHOOTING

## 2025-03-21 ASSESSMENT — PAIN DESCRIPTION - ORIENTATION: ORIENTATION: LEFT

## 2025-03-21 NOTE — PROGRESS NOTES
[facility]  Outpatient Physical Therapy    Treatment Note        Date: 3/21/2025  Patient: Criselda Tabares  : 1945   Confirmed: Yes  MRN: [id number]  Referring Provider: Will Clayton DO   Secondary Referring Provider (If applicable):     Medical Diagnosis: Low back pain, unspecified back pain laterality, unspecified chronicity, unspecified whether sciatica present [M54.50]    Treatment Diagnosis: Low back pain, L hip pain, LE strength    Visit Information:  Insurance: Payor: HUMANA MEDICARE / Plan: HUMANA GOLD PLUS HMO / Product Type: *No Product type* /   PT Visit Information  PT Insurance Information: [employer] Medicare  Total # of Visits Approved: 10  Total # of Visits to Date: 3  Plan of Care/Certification Expiration Date: 25  No Show: 0  Progress Note Due Date: 25  Canceled Appointment: 0  Progress Note Counter: 3/10    Subjective Information:  Subjective: \"It feels like i have a pebble in the bottom of my foot. This started a couple weeks ago. it's on and off . I've inspected shoes and socks and feet. nothing is there, It just feels like it. my overall pain is 5/10 with the leg going up to 7/10 when it starts going. \"  HEP Compliance:  [x] Good [] Fair [] Poor [] Reports not doing due to:    Pain Screening  Patient Currently in Pain: Yes  Pain Level: 5  Pain Location: Leg, Hip, Shoulder  Pain Orientation: Left  Pain Descriptors: Aching, Shooting    Treatment:  Exercises:  Exercises  Exercise 1: hamstr with stool 3 reps x20\" holds, raul  Exercise 3: seated gluteal stretch 30\" x 3  Exercise 4: SLR x15 reps ; S/L hip abd x10 reps, raul  Exercise 5: Supine fig 4 push/pull held push due to increased pain  Exercise 6: 6\" fwd step ups x 10 raul LE  Exercise 7: 1/2/ squats with raul UE support. Lateral shift noted  Exercise 8: standing hip abd / ext x10 reps, raul  Exercise 9: LTR 10 reps x 5 sec holds  Exercise 11: Supine nerve glide x10 reps  Exercise 12: clams / reverse clams x15 reps, ea

## 2025-03-25 ENCOUNTER — OFFICE VISIT (OUTPATIENT)
Age: 80
End: 2025-03-25
Payer: MEDICARE

## 2025-03-25 VITALS
DIASTOLIC BLOOD PRESSURE: 72 MMHG | HEIGHT: 66 IN | OXYGEN SATURATION: 97 % | HEART RATE: 73 BPM | BODY MASS INDEX: 20.54 KG/M2 | SYSTOLIC BLOOD PRESSURE: 110 MMHG | TEMPERATURE: 97.3 F | WEIGHT: 127.8 LBS

## 2025-03-25 DIAGNOSIS — K58.0 IRRITABLE BOWEL SYNDROME WITH DIARRHEA: ICD-10-CM

## 2025-03-25 DIAGNOSIS — G47.00 INSOMNIA, UNSPECIFIED TYPE: ICD-10-CM

## 2025-03-25 DIAGNOSIS — Z00.00 MEDICARE ANNUAL WELLNESS VISIT, SUBSEQUENT: ICD-10-CM

## 2025-03-25 DIAGNOSIS — M35.01 SJOGREN'S SYNDROME WITH KERATOCONJUNCTIVITIS SICCA: ICD-10-CM

## 2025-03-25 DIAGNOSIS — R25.1 TREMOR: ICD-10-CM

## 2025-03-25 DIAGNOSIS — E04.1 THYROID NODULE: Primary | ICD-10-CM

## 2025-03-25 DIAGNOSIS — G89.29 CHRONIC PAIN OF BOTH SHOULDERS: ICD-10-CM

## 2025-03-25 DIAGNOSIS — R51.9 CHRONIC NONINTRACTABLE HEADACHE, UNSPECIFIED HEADACHE TYPE: ICD-10-CM

## 2025-03-25 DIAGNOSIS — M25.512 CHRONIC PAIN OF BOTH SHOULDERS: ICD-10-CM

## 2025-03-25 DIAGNOSIS — G89.29 CHRONIC NONINTRACTABLE HEADACHE, UNSPECIFIED HEADACHE TYPE: ICD-10-CM

## 2025-03-25 DIAGNOSIS — E78.5 DYSLIPIDEMIA: ICD-10-CM

## 2025-03-25 DIAGNOSIS — M25.511 CHRONIC PAIN OF BOTH SHOULDERS: ICD-10-CM

## 2025-03-25 DIAGNOSIS — M47.26 OSTEOARTHRITIS OF SPINE WITH RADICULOPATHY, LUMBAR REGION: ICD-10-CM

## 2025-03-25 DIAGNOSIS — K21.9 GASTROESOPHAGEAL REFLUX DISEASE WITHOUT ESOPHAGITIS: ICD-10-CM

## 2025-03-25 PROCEDURE — 1090F PRES/ABSN URINE INCON ASSESS: CPT | Performed by: FAMILY MEDICINE

## 2025-03-25 PROCEDURE — 1123F ACP DISCUSS/DSCN MKR DOCD: CPT | Performed by: FAMILY MEDICINE

## 2025-03-25 PROCEDURE — G8427 DOCREV CUR MEDS BY ELIG CLIN: HCPCS | Performed by: FAMILY MEDICINE

## 2025-03-25 PROCEDURE — G8420 CALC BMI NORM PARAMETERS: HCPCS | Performed by: FAMILY MEDICINE

## 2025-03-25 PROCEDURE — 1036F TOBACCO NON-USER: CPT | Performed by: FAMILY MEDICINE

## 2025-03-25 PROCEDURE — 1159F MED LIST DOCD IN RCRD: CPT | Performed by: FAMILY MEDICINE

## 2025-03-25 PROCEDURE — G0439 PPPS, SUBSEQ VISIT: HCPCS | Performed by: FAMILY MEDICINE

## 2025-03-25 PROCEDURE — G8399 PT W/DXA RESULTS DOCUMENT: HCPCS | Performed by: FAMILY MEDICINE

## 2025-03-25 PROCEDURE — 99214 OFFICE O/P EST MOD 30 MIN: CPT | Performed by: FAMILY MEDICINE

## 2025-03-25 RX ORDER — TRIAMCINOLONE ACETONIDE 1 MG/G
OINTMENT TOPICAL 2 TIMES DAILY
COMMUNITY
Start: 2025-02-16

## 2025-03-25 RX ORDER — NAPROXEN 250 MG/1
500 TABLET ORAL EVERY 12 HOURS PRN
Qty: 180 TABLET | Refills: 3 | Status: SHIPPED | OUTPATIENT
Start: 2025-03-25

## 2025-03-25 RX ORDER — FLUOROURACIL 50 MG/G
CREAM TOPICAL 2 TIMES DAILY
COMMUNITY
Start: 2025-02-18

## 2025-03-25 RX ORDER — TRAZODONE HYDROCHLORIDE 50 MG/1
50 TABLET ORAL NIGHTLY
Qty: 90 TABLET | Refills: 2 | Status: SHIPPED | OUTPATIENT
Start: 2025-03-25

## 2025-03-25 SDOH — ECONOMIC STABILITY: FOOD INSECURITY: WITHIN THE PAST 12 MONTHS, YOU WORRIED THAT YOUR FOOD WOULD RUN OUT BEFORE YOU GOT MONEY TO BUY MORE.: NEVER TRUE

## 2025-03-25 SDOH — ECONOMIC STABILITY: FOOD INSECURITY: WITHIN THE PAST 12 MONTHS, THE FOOD YOU BOUGHT JUST DIDN'T LAST AND YOU DIDN'T HAVE MONEY TO GET MORE.: NEVER TRUE

## 2025-03-25 ASSESSMENT — PATIENT HEALTH QUESTIONNAIRE - PHQ9
4. FEELING TIRED OR HAVING LITTLE ENERGY: NOT AT ALL
6. FEELING BAD ABOUT YOURSELF - OR THAT YOU ARE A FAILURE OR HAVE LET YOURSELF OR YOUR FAMILY DOWN: NOT AT ALL
7. TROUBLE CONCENTRATING ON THINGS, SUCH AS READING THE NEWSPAPER OR WATCHING TELEVISION: NOT AT ALL
2. FEELING DOWN, DEPRESSED OR HOPELESS: NOT AT ALL
SUM OF ALL RESPONSES TO PHQ QUESTIONS 1-9: 0
8. MOVING OR SPEAKING SO SLOWLY THAT OTHER PEOPLE COULD HAVE NOTICED. OR THE OPPOSITE, BEING SO FIGETY OR RESTLESS THAT YOU HAVE BEEN MOVING AROUND A LOT MORE THAN USUAL: NOT AT ALL
3. TROUBLE FALLING OR STAYING ASLEEP: NOT AT ALL
9. THOUGHTS THAT YOU WOULD BE BETTER OFF DEAD, OR OF HURTING YOURSELF: NOT AT ALL
SUM OF ALL RESPONSES TO PHQ QUESTIONS 1-9: 0
5. POOR APPETITE OR OVEREATING: NOT AT ALL
10. IF YOU CHECKED OFF ANY PROBLEMS, HOW DIFFICULT HAVE THESE PROBLEMS MADE IT FOR YOU TO DO YOUR WORK, TAKE CARE OF THINGS AT HOME, OR GET ALONG WITH OTHER PEOPLE: NOT DIFFICULT AT ALL
SUM OF ALL RESPONSES TO PHQ QUESTIONS 1-9: 0
SUM OF ALL RESPONSES TO PHQ QUESTIONS 1-9: 0
1. LITTLE INTEREST OR PLEASURE IN DOING THINGS: NOT AT ALL

## 2025-03-25 NOTE — PROGRESS NOTES
Chief Complaint   Patient presents with    1 Year Follow Up     Patient would like to discuss about pain management.    Medicare AWV     Awv- subsequent    Health Maintenance     Pt had mri & ct of back- still needs dexa?        HPI: Criselda Tabarse 79 y.o. female presenting for   History of Present Illness  The patient presents for evaluation of back, hip, and shoulder pain.    Degenerative arthritis/chronic pain   Pain is reported in the back, hips, and shoulders. A recent CT scan was conducted under the supervision of Dr. Krishnamurthy, whom she is scheduled to see tomorrow. Physical therapy has unfortunately exacerbated her symptoms, causing radiating pain down the right leg and a sensation akin to walking on a pebble. Tingling sensations are also experienced. The left side of the hip is painful as well. Daily naps are taken due to fatigue induced by the pain. Naproxen is taken at a dosage of 2 tablets daily with breakfast, and a refill is requested.    Shoulder pain   Additionally, bilateral shoulder pain has been intermittent for several months, described as being located in the joint. Consultation with her rheumatologist resulted in limited treatment options, which she found unsatisfactory. No x-rays of the shoulders have been conducted in the past.    She is currently on Prolia and is considering whether an DEXA scan would be necessary.    Neurological consultation for tremors has not been sought, and the condition remains unchanged.    Trazodone continues to be taken, and a refill is requested.    For acid reflux, over-the-counter Nexium is used.      Hypervitaminosis D   Patient was taking vitmain D   Managed by rheum  Noted to be elevated   Vitamin d was stopped   Needs levels rechecked.      F/u   Normal vitamin D      Sjogren   Still has dry mouth   And eye drops - is on the albumin.  - managed by ophtalmology      Thryoidmegaly   Feels like the thryoid was englarge   Admits to some pain   Has baseline tremors -

## 2025-03-25 NOTE — PATIENT INSTRUCTIONS
Fatigue: Care Instructions  Overview     Fatigue is a feeling of tiredness, exhaustion, or lack of energy. You may feel fatigue because of too much or not enough activity. It can also come from stress, lack of sleep, boredom, and poor diet. Many medical problems, such as viral infections, can cause fatigue. Emotional problems, especially depression, are often the cause of fatigue.  Fatigue is most often a symptom of another problem. Treatment for fatigue depends on the cause. For example, if you have fatigue because you have a certain health problem, treating this problem also treats your fatigue. If depression or anxiety is the cause, treatment may help.  Follow-up care is a key part of your treatment and safety. Be sure to make and go to all appointments, and call your doctor if you are having problems. It's also a good idea to know your test results and keep a list of the medicines you take.  How can you care for yourself at home?  Get regular exercise. But try not to overdo it. It may help to go back and forth between rest and exercise.  Get plenty of rest.  Eat a variety of healthy foods. Try not to skip any meals.  Avoid or try to cut back on your use of caffeine, tobacco, and alcohol. Caffeine is most often found in coffee, tea, cola drinks, and energy drinks.  Limit medicines that can cause fatigue. These include medicines such as cold and allergy medicines.  When should you call for help?  Watch closely for changes in your health, and be sure to contact your doctor if:    You have new symptoms such as fever or a rash.     Your fatigue gets worse.     You have been feeling down, depressed, or hopeless. Or you may have lost interest in things that you usually enjoy.     You are not getting better as expected.   Where can you learn more?  Go to https://www.healthwise.net/patientEd and enter W864 to learn more about \"Fatigue: Care Instructions.\"  Current as of: July 31, 2024  Content Version: 14.4  ©

## 2025-03-26 ENCOUNTER — RESULTS FOLLOW-UP (OUTPATIENT)
Age: 80
End: 2025-03-26

## 2025-03-26 ENCOUNTER — OFFICE VISIT (OUTPATIENT)
Dept: ORTHOPEDIC SURGERY | Age: 80
End: 2025-03-26
Payer: MEDICARE

## 2025-03-26 ENCOUNTER — HOSPITAL ENCOUNTER (OUTPATIENT)
Dept: PHYSICAL THERAPY | Age: 80
Setting detail: THERAPIES SERIES
Discharge: HOME | End: 2025-03-26
Payer: MEDICARE

## 2025-03-26 VITALS
HEART RATE: 75 BPM | BODY MASS INDEX: 20.41 KG/M2 | HEIGHT: 66 IN | RESPIRATION RATE: 16 BRPM | WEIGHT: 127 LBS | TEMPERATURE: 97.6 F | OXYGEN SATURATION: 99 %

## 2025-03-26 DIAGNOSIS — M43.16 SPONDYLOLISTHESIS AT L4-L5 LEVEL: ICD-10-CM

## 2025-03-26 DIAGNOSIS — M48.061 SPINAL STENOSIS AT L4-L5 LEVEL: ICD-10-CM

## 2025-03-26 DIAGNOSIS — M81.0 AGE RELATED OSTEOPOROSIS, UNSPECIFIED PATHOLOGICAL FRACTURE PRESENCE: Primary | ICD-10-CM

## 2025-03-26 DIAGNOSIS — M19.019 SHOULDER ARTHRITIS: Primary | ICD-10-CM

## 2025-03-26 PROCEDURE — G8420 CALC BMI NORM PARAMETERS: HCPCS | Performed by: ORTHOPAEDIC SURGERY

## 2025-03-26 PROCEDURE — 1090F PRES/ABSN URINE INCON ASSESS: CPT | Performed by: ORTHOPAEDIC SURGERY

## 2025-03-26 PROCEDURE — G8399 PT W/DXA RESULTS DOCUMENT: HCPCS | Performed by: ORTHOPAEDIC SURGERY

## 2025-03-26 PROCEDURE — 99214 OFFICE O/P EST MOD 30 MIN: CPT | Performed by: ORTHOPAEDIC SURGERY

## 2025-03-26 PROCEDURE — 1159F MED LIST DOCD IN RCRD: CPT | Performed by: ORTHOPAEDIC SURGERY

## 2025-03-26 PROCEDURE — 1123F ACP DISCUSS/DSCN MKR DOCD: CPT | Performed by: ORTHOPAEDIC SURGERY

## 2025-03-26 PROCEDURE — 1036F TOBACCO NON-USER: CPT | Performed by: ORTHOPAEDIC SURGERY

## 2025-03-26 PROCEDURE — G8427 DOCREV CUR MEDS BY ELIG CLIN: HCPCS | Performed by: ORTHOPAEDIC SURGERY

## 2025-03-26 RX ORDER — ROMOSOZUMAB-AQQG 105 MG/1.17ML
210 INJECTION, SOLUTION SUBCUTANEOUS
Qty: 1 EACH | Refills: 11 | Status: SHIPPED | OUTPATIENT
Start: 2025-03-26

## 2025-03-26 RX ORDER — METHYLPREDNISOLONE 4 MG/1
TABLET ORAL
Qty: 1 KIT | Refills: 0 | Status: SHIPPED | OUTPATIENT
Start: 2025-03-26 | End: 2025-04-01

## 2025-03-26 RX ORDER — DIAZEPAM 5 MG/1
TABLET ORAL
Qty: 2 TABLET | Refills: 0 | Status: SHIPPED | OUTPATIENT
Start: 2025-03-26 | End: 2027-03-25

## 2025-03-26 NOTE — PROGRESS NOTES
Subjective:      Patient ID: Criselda Tabares is a 79 y.o. female who presents today for:  Chief Complaint   Patient presents with    Follow-up     pt is present for back pain follow up  MRI done 3/12/25  pain:0/10 with out activity   PT yes but states that PT has made her symptoms worse         Subjective/Objective/Assessment/Plan:     SUBJECTIVE -low back pain with bilateral leg radiculopathy.  She states has been ongoing for roughly a year.    OBJECTIVE - The patient can rise up on their toes and rise up on her heels.  5 out of 5 hip flexion and knee extension strength bilaterally.  Sensation intact bilaterally in the lower extremities from L2-S1.  Positive seated straight leg raise    ASSESSMENT    Diagnosis Orders   1. Age related osteoporosis, unspecified pathological fracture presence        2. Spondylolisthesis at L4-L5 level        3. Spinal stenosis at L4-L5 level              PLAN -I reviewed her vitamin D level which was 56.9.   She is osteoporotic with a T-score of -2.6.  She is on Prolia.  I want to switch her to Evenity for 1 year and then we will put her back on Prolia.  She wants to proceed with an injection.  She is in need of a spinal fusion L4-5 due to the spondylolisthesis and severe spinal stenosis at that level.  She has other levels in her back which are pain generators.  I told her if I did surgery would want her on Evenity for at least 6 months because of her low bone density.  My goal would be 50% improvement in symptoms.       Surgery Phone: [phone]   [facility]   Surgery Fax: [phone]    Phone: [phone]          Fax: [phone]    Orthopedics: Surgery Scheduling, PAT & PRE-OP Order Form  Call to advance [place] at [phone] at least 24 hours prior to date of service     Surgery Location:[facility]: [address] [place] [address]\"    3/26/2025     OFFICE   Surgeon's Name:Will Clayton DO Surgery Date: TBD Time: TBD  Patient's Name: Criselda CASANOVA

## 2025-03-26 NOTE — PROGRESS NOTES
Therapy                            Cancellation/No-show Note    Date: 2025  Patient: Criselda Tabares (79 y.o. female)  : 1945  MRN:  [id number]  Referring Physician: Will Clayton DO    Medical Diagnosis: Low back pain, unspecified back pain laterality, unspecified chronicity, unspecified whether sciatica present [M54.50]      Visit Information:  Insurance: Payor: HU[employer]NA MEDICARE / Plan: HUMANA GOLD PLUS HMO / Product Type: *No Product type* /   Visits to Date: 3   No Show/Cancelled Appts: 0 /       For today's appointment patient:  [x]  Cancelled  []  Rescheduled appointment  []  No-show   []  Called pt to remind of next appointment     Reason given by patient:  [x]  Patient ill  []  Conflicting appointment  []  No transportation    []  Conflict with work  []  No reason given  []  Other:      [x] Pt has future appointments scheduled, no follow up needed  [] Pt requests to be on hold.    Reason:   If > 2 weeks please discuss with therapist.  [] Therapist to call pt for follow up  []  to call pt to reschedule   Comments:       Signature: Electronically signed by Yolanda Gaffney PT on 3/26/25 at 1:24 PM EDT

## 2025-03-27 ENCOUNTER — PREP FOR PROCEDURE (OUTPATIENT)
Dept: ORTHOPEDIC SURGERY | Age: 80
End: 2025-03-27

## 2025-03-27 PROBLEM — M48.061 LUMBAR SPINAL STENOSIS: Status: ACTIVE | Noted: 2025-03-27

## 2025-04-01 ENCOUNTER — HOSPITAL ENCOUNTER (OUTPATIENT)
Dept: PHYSICAL THERAPY | Age: 80
Setting detail: THERAPIES SERIES
Discharge: HOME | End: 2025-04-01
Payer: MEDICARE

## 2025-04-01 PROCEDURE — 97110 THERAPEUTIC EXERCISES: CPT

## 2025-04-01 ASSESSMENT — PAIN DESCRIPTION - LOCATION: LOCATION: LEG;HIP

## 2025-04-01 ASSESSMENT — PAIN SCALES - GENERAL: PAINLEVEL_OUTOF10: 3

## 2025-04-01 ASSESSMENT — PAIN DESCRIPTION - ORIENTATION: ORIENTATION: RIGHT;LEFT

## 2025-04-01 ASSESSMENT — PAIN DESCRIPTION - DESCRIPTORS: DESCRIPTORS: ACHING

## 2025-04-01 NOTE — PROGRESS NOTES
[facility]  PHYSICAL THERAPY PLAN OF CARE                                    [facility] [place] [address]     Ph: [phone]  Fax: [phone]    [] Certification  [] Recertification []  Plan of Care  [x] Progress Note [] Discharge      Referring Provider: Will Clayton DO    From:  Yolanda Gaffney, PT, DPT    Patient: Criselda Tabares (79 y.o. female) : 1945 Date: 2025   Medical Diagnosis: Low back pain, unspecified back pain laterality, unspecified chronicity, unspecified whether sciatica present [M54.50]    Treatment Diagnosis: Low back pain, L hip pain, LE strength    Plan of Care/Certification Expiration Date: 25   Progress Report Period from: 3/05/2025  to 2025    Visits to Date: 5 No Show: 0 Cancelled Appts: 1    OBJECTIVE:     Long Term Goals - Time Frame for Long Term Goals : 4-6 weeks  Goals Current/ Discharge status Status   Long Term Goal 1: Patient will improve bilateral LE strength to >/=4/5 for improved WBing tolerance during functional tasks. LTG 1 Current Status:: : RIGHT: flexion: 3+/5 ; abduction: 4-/5 ; extension: 3/5 ; LEFT: flexion: 4+/5 ; abduction: 3+/5 ; extension: 3-/5   In progress   Long Term Goal 2: Patient will improve bilateral SBing by >/=25% to demonstrate improved flexibility and ADL tasks. LTG 2 Current Status:: : ~50% bilaterally without pain   In progress   Long Term Goal 3: Patient will report being able to tolerate standing >/=30-45min with minimal to no pain to demonstrate improved pain tolerance. LTG 3 Current Status:: : reports tolerates standing about 30 min   In progress   Long Term Goal 4: Patient will demonstrate >/=8 point improvement on KELLEY for improved subjective function. LTG 4 Current Status:: :  ( at evaluation)   In progress     Body Structures, Functions, Activity Limitations Requiring Skilled Therapeutic Intervention: Decreased ROM, Decreased body mechanics, Decreased strength, Increased

## 2025-04-01 NOTE — PROGRESS NOTES
[facility]  Outpatient Physical Therapy   Treatment Note        Date: 2025  Patient: Criselda Tabares  : 1945   Confirmed: Yes  MRN: [id number]  Referring Provider: Will Clayton DO      Medical Diagnosis: Low back pain, unspecified back pain laterality, unspecified chronicity, unspecified whether sciatica present [M54.50]      Treatment Diagnosis: Low back pain, L hip pain, LE strength    Visit Information:  Insurance: Payor: HUMANA MEDICARE / Plan: HUMANA GOLD PLUS HMO / Product Type: *No Product type* /   PT Visit Information  PT Insurance Information: [employer] Medicare  Total # of Visits Approved: 10  Total # of Visits to Date: 5  Plan of Care/Certification Expiration Date: 25  No Show: 0  Progress Note Due Date: 25  Canceled Appointment: 1  Progress Note Counter: 5/10    Subjective Information:  Subjective: Reports she had a f/u with Dr. Clayton, reports she is going to be getting an injection. Reports she was given steroid taper and helped with the inflammation, reports not getting as much pain in her thighs, but still getting radiating pain distally in R LE.  HEP Compliance:  [x] Good [] Fair [] Poor [] Reports not doing due to:    Pain Screening  Patient Currently in Pain: Yes  Pain Level: 3  Pain Location: Leg, Hip  Pain Orientation: Right, Left  Pain Descriptors: Aching    Treatment:  Exercises:  Exercises  Exercise 1: hamstr with strap 3 reps x20\" holds, raul  Exercise 3: bridges x10 reps x3-5 sec holds ; bridge with march x10 reps  Exercise 4: 3-way hip x15 reps, raul  Exercise 6: 6\" fwd / lat step ups x 10 raul LE  Exercise 7: STS from mat at chair level - staggered x10 reps, raul  Exercise 9: plank rotation x10 reps  Exercise 13: paloff press RTB x10 reps, raul  Exercise 19: Objective measures for PN  Exercise 20: HEP: cont current + plank rotation    *Indicates exercise, modality, or manual techniques to be initiated when appropriate    Objective Measures:     LTG 1 Current

## 2025-04-15 ENCOUNTER — HOSPITAL ENCOUNTER (OUTPATIENT)
Dept: PHYSICAL THERAPY | Age: 80
Setting detail: THERAPIES SERIES
Discharge: HOME | End: 2025-04-15
Payer: MEDICARE

## 2025-04-15 PROCEDURE — 97110 THERAPEUTIC EXERCISES: CPT

## 2025-04-15 ASSESSMENT — PAIN DESCRIPTION - LOCATION: LOCATION: BACK

## 2025-04-15 ASSESSMENT — PAIN SCALES - GENERAL: PAINLEVEL_OUTOF10: 5

## 2025-04-15 ASSESSMENT — PAIN DESCRIPTION - ORIENTATION: ORIENTATION: LOWER;MID

## 2025-04-15 NOTE — PROGRESS NOTES
[facility]  Outpatient Physical Therapy    Treatment Note        Date: 4/15/2025  Patient: Criselda Tabares  : 1945   Confirmed: Yes  MRN: [id number]  Referring Provider: Will Clayton DO    Medical Diagnosis: Low back pain, unspecified back pain laterality, unspecified chronicity, unspecified whether sciatica present [M54.50]       Treatment Diagnosis: Low back pain, L hip pain, LE strength    Visit Information:  Insurance: Payor: HUMANA MEDICARE / Plan: HUMANA GOLD PLUS HMO / Product Type: *No Product type* /   PT Visit Information  PT Insurance Information: [employer] Medicare  Total # of Visits Approved: 10  Total # of Visits to Date: 6  Plan of Care/Certification Expiration Date: 25  No Show: 0  Progress Note Due Date: 25  Canceled Appointment: 1  Progress Note Counter: 6/10    Subjective Information:  Subjective: \"My R leg gets worse with standing and amb\" \"my pain is worse because of cooking and standing\"  HEP Compliance:  [x] Good [] Fair [] Poor [] Reports not doing due to:    Pain Screening  Patient Currently in Pain: Yes  Pain Level: 5  Pain Location: Back  Pain Orientation: Lower, Mid    Treatment:  Exercises:  Exercises  Exercise 1: hamstr with strap 3 reps x20\" holds, raul  Exercise 3: bridges x12  Exercise 4: 3-way hip x15 reps, raul with TA iso  Exercise 5: supine fig 4 push 30 sec X 3 R/L  Exercise 9: plank rotation x10 reps  Exercise 11: seated and standing sciatic nerve glide X 10 R/L  Exercise 13: paloff press RTB x10 reps, raul  Exercise 14: TA iso 10 sec X 10; heel walk outs DLS X 10  Exercise 15: DKTC pball X 10 with TA iso  Exercise 20: HEP: cont current    Manual:   Manual Therapy  Soft Tissue Mobilizaton: lumbar paraspinal B and B hips  x5 min  *Indicates exercise, modality, or manual techniques to be initiated when appropriate    Objective Measures:   Strength: [] NT  [] MMT completed:    ROM: [x] NT  [] ROM measurements:    Assessment:   Body Structures, Functions,

## 2025-04-17 ENCOUNTER — HOSPITAL ENCOUNTER (OUTPATIENT)
Dept: PHYSICAL THERAPY | Age: 80
Setting detail: THERAPIES SERIES
Discharge: HOME | End: 2025-04-17
Payer: MEDICARE

## 2025-04-17 PROCEDURE — 97110 THERAPEUTIC EXERCISES: CPT

## 2025-04-17 PROCEDURE — 97140 MANUAL THERAPY 1/> REGIONS: CPT

## 2025-04-17 ASSESSMENT — PAIN DESCRIPTION - LOCATION: LOCATION: BACK

## 2025-04-17 ASSESSMENT — PAIN DESCRIPTION - ORIENTATION: ORIENTATION: LOWER;MID

## 2025-04-17 ASSESSMENT — PAIN SCALES - GENERAL: PAINLEVEL_OUTOF10: 0

## 2025-04-17 NOTE — PROGRESS NOTES
[facility]  Outpatient Physical Therapy   Treatment Note        Date: 2025  Patient: Criselda Tabares  : 1945   Confirmed: Yes  MRN: [id number]  Referring Provider: Will Clayton DO      Medical Diagnosis: Low back pain, unspecified back pain laterality, unspecified chronicity, unspecified whether sciatica present [M54.50]      Treatment Diagnosis: Low back pain, L hip pain, LE strength    Visit Information:  Insurance: Payor: HUMANA MEDICARE / Plan: HUMANA GOLD PLUS HMO / Product Type: *No Product type* /   PT Visit Information  PT Insurance Information: [employer] Medicare  Total # of Visits Approved: 10  Total # of Visits to Date: 7  Plan of Care/Certification Expiration Date: 25  No Show: 0  Progress Note Due Date: 25  Canceled Appointment: 1  Progress Note Counter: 7/10    Subjective Information:  Subjective: Pt reports that the back conts to hurt  HEP Compliance:  [x] Good [] Fair [] Poor [] Reports not doing due to:      Pain Screening  Patient Currently in Pain: Yes  Pain Assessment: 0-10  Pain Level: 0 (with standing 5/10 with sitting)  Pain Location: Back  Pain Orientation: Lower, Mid    Treatment:  Exercises:  Exercises  Exercise 3: bridges [id number]  Exercise 4: 3-way hip x15 reps, raul with TA iso  Exercise 5: supine fig 4 push 30 sec X 3 R/L  Exercise 6: 6\" fwd / lat step ups x 10 raul LE  Exercise 9: plank rotation x10 reps  Exercise 13: paloff press RTB x10 reps, raul       Manual:   Manual Therapy  Soft Tissue Mobilizaton: lumbar paraspinal B and B hips  x8 min     *Indicates exercise, modality, or manual techniques to be initiated when appropriate    Assessment:   Body Structures, Functions, Activity Limitations Requiring Skilled Therapeutic Intervention: Decreased ROM, Decreased body mechanics, Decreased strength, Increased pain  Assessment: Pt reports increased spasm in back with supine to sit. Manual STM to  LB to decrease. Pt able to liset increased reps in supine to

## 2025-04-22 ENCOUNTER — HOSPITAL ENCOUNTER (OUTPATIENT)
Dept: PHYSICAL THERAPY | Age: 80
Setting detail: THERAPIES SERIES
Discharge: HOME | End: 2025-04-22
Payer: MEDICARE

## 2025-04-22 PROCEDURE — 97140 MANUAL THERAPY 1/> REGIONS: CPT

## 2025-04-22 PROCEDURE — 97110 THERAPEUTIC EXERCISES: CPT

## 2025-04-22 ASSESSMENT — PAIN DESCRIPTION - ORIENTATION: ORIENTATION: LOWER

## 2025-04-22 ASSESSMENT — PAIN DESCRIPTION - DESCRIPTORS: DESCRIPTORS: ACHING

## 2025-04-22 ASSESSMENT — PAIN DESCRIPTION - LOCATION: LOCATION: BACK

## 2025-04-22 ASSESSMENT — PAIN SCALES - GENERAL: PAINLEVEL_OUTOF10: 5

## 2025-04-22 NOTE — PROGRESS NOTES
Intervention: Decreased ROM, Decreased body mechanics, Decreased strength, Increased pain  Assessment: Pt with increased muscle spasm with bridges this date in L hamstring and LB. Changed to piriformis stretch with improved liset to bridge after. Pt with no pain or difficulty with standing exercises.  Treatment Diagnosis: Low back pain, L hip pain, LE strength  Therapy Prognosis: Good      Post-Pain Assessment:       Pain Rating (0-10 pain scale):   3/10   Location and pain description same as pre-treatment unless indicated.   Action: [] NA   [x] Perform HEP  [] Meds as prescribed  [] Modalities as prescribed   [] Call Physician     GOALS   Patient Goal(s): Patient Goals : decrease pain    Long Term Goals Completed by 4-6 weeks Goal Status   LTG 1 Patient will improve bilateral LE strength to >/=4/5 for improved WBing tolerance during functional tasks. In progress   LTG 2 Patient will improve bilateral SBing by >/=25% to demonstrate improved flexibility and ADL tasks. In progress   LTG 3 Patient will report being able to tolerate standing >/=30-45min with minimal to no pain to demonstrate improved pain tolerance. In progress   LTG 4 Patient will demonstrate >/=8 point improvement on KELLEY for improved subjective function. In progress     Plan:  Frequency/Duration:  Plan  Plan Frequency: 1-2  Plan weeks: 4-6  Current Treatment Recommendations: Strengthening, ROM, Functional mobility training, Neuromuscular re-education, Manual, Pain management, Home exercise program, Safety education & training, Patient/Caregiver education & training, Modalities, Group Therapy  Modalities: Heat/Cold  Pt to continue current HEP.  See objective section for any therapeutic exercise changes, additions or modifications this date.    Therapy Time:      PT Individual Minutes  Time In: 1345  Time Out: 1423  Minutes: 38  Timed Code Treatment Minutes: 38 Minutes  Procedure Minutes:  Timed Activity Minutes Units   Ther Ex 30 2   Manual  8 1

## 2025-04-23 ENCOUNTER — HOSPITAL ENCOUNTER (OUTPATIENT)
Dept: INFUSION THERAPY | Age: 80
Setting detail: INFUSION SERIES
Discharge: HOME | End: 2025-04-23
Payer: MEDICARE

## 2025-04-23 VITALS
RESPIRATION RATE: 18 BRPM | HEART RATE: 67 BPM | OXYGEN SATURATION: 98 % | DIASTOLIC BLOOD PRESSURE: 71 MMHG | TEMPERATURE: 97.1 F | SYSTOLIC BLOOD PRESSURE: 102 MMHG

## 2025-04-23 DIAGNOSIS — M81.0 AGE RELATED OSTEOPOROSIS, UNSPECIFIED PATHOLOGICAL FRACTURE PRESENCE: Primary | ICD-10-CM

## 2025-04-23 PROCEDURE — 6360000002 HC RX W HCPCS: Performed by: ORTHOPAEDIC SURGERY

## 2025-04-23 PROCEDURE — 96401 CHEMO ANTI-NEOPL SQ/IM: CPT

## 2025-04-23 RX ADMIN — ROMOSOZUMAB-AQQG 105 MG: 105 INJECTION, SOLUTION SUBCUTANEOUS at 10:27

## 2025-04-23 ASSESSMENT — PAIN DESCRIPTION - DESCRIPTORS: DESCRIPTORS: ACHING

## 2025-04-23 ASSESSMENT — PAIN DESCRIPTION - LOCATION: LOCATION: BACK

## 2025-04-23 ASSESSMENT — PAIN DESCRIPTION - ORIENTATION: ORIENTATION: LOWER

## 2025-04-23 ASSESSMENT — PAIN SCALES - GENERAL: PAINLEVEL_OUTOF10: 9

## 2025-04-23 NOTE — FLOWSHEET NOTE
No side effects noted. AVS printed and given to patient. Left the unit ambulatory with friend. All equipment used in the care for this patient has been cleaned.

## 2025-04-23 NOTE — FLOWSHEET NOTE
1 injection given in each arm. Tolerated well. Observation started. Call light within reach. Visitor at side.

## 2025-04-24 ENCOUNTER — TELEPHONE (OUTPATIENT)
Age: 80
End: 2025-04-24

## 2025-04-24 ENCOUNTER — HOSPITAL ENCOUNTER (OUTPATIENT)
Dept: PHYSICAL THERAPY | Age: 80
Setting detail: THERAPIES SERIES
Discharge: HOME | End: 2025-04-24
Payer: MEDICARE

## 2025-04-24 PROCEDURE — 97110 THERAPEUTIC EXERCISES: CPT

## 2025-04-24 ASSESSMENT — PAIN DESCRIPTION - DESCRIPTORS: DESCRIPTORS: ACHING

## 2025-04-24 ASSESSMENT — PAIN DESCRIPTION - LOCATION: LOCATION: BACK

## 2025-04-24 ASSESSMENT — PAIN SCALES - GENERAL: PAINLEVEL_OUTOF10: 2

## 2025-04-24 NOTE — PROGRESS NOTES
[facility]  Outpatient Physical Therapy   Treatment Note        Date: 2025  Patient: Criselda Tabares  : 1945   Confirmed: Yes  MRN: [id number]  Referring Provider: Will Clayton DO      Medical Diagnosis: Low back pain, unspecified back pain laterality, unspecified chronicity, unspecified whether sciatica present [M54.50]      Treatment Diagnosis: Low back pain, L hip pain, LE strength    Visit Information:  Insurance: Payor: HUMANA MEDICARE / Plan: HUMANA GOLD PLUS HMO / Product Type: *No Product type* /   PT Visit Information  PT Insurance Information: [employer] Medicare  Total # of Visits Approved: 10  Total # of Visits to Date: 9  Plan of Care/Certification Expiration Date: 25  No Show: 0  Progress Note Due Date: 25  Canceled Appointment: 1  Progress Note Counter: 9/10    Subjective Information:  Subjective: Patient reports she is doing ok, feels like therapy has made some improvements. Reports she is ok to discharge today. Recieved injection for her osteoporosis yesterday and is scheduled for caudal injection next week for spine.  HEP Compliance:  [x] Good [] Fair [] Poor [] Reports not doing due to:    Pain Screening  Patient Currently in Pain: Yes  Pain Level: 2  Pain Location: Back  Pain Descriptors: Aching    Treatment:  Exercises:  Exercises  Exercise 3: bridges 3sec x10 reps x 2 sets x RTB  Exercise 4: 3-way SLR 1.5# x10 reps, ea, raul  Exercise 10: lift-chop 4# ball x10 reps, raul  Exercise 13: paloff press GTB x15 reps; with rotation GTB x10 reps, raul  Exercise 14: reverse crunch with ball b/t knee x10 reps  Exercise 15: H/L march RTB x15 reps, ea  Exercise 19: Objective measures for discharge  Exercise 20: HEP: cont current    *Indicates exercise, modality, or manual techniques to be initiated when appropriate    Objective Measures:     LTG 1 Current Status:: : RIGHT: flexion: 4/5 ; abduction: 5/5 ; extension: 3+/5 ; LEFT: flexion: 4+/5 ; abduction: 5/5 ; extension: 
Statement Selected

## 2025-04-24 NOTE — THERAPY DISCHARGE
[facility]  PHYSICAL THERAPY PLAN OF CARE                                    [facility] [address] [place] [address]     Ph: [phone]  Fax: [phone]    [] Certification  [] Recertification []  Plan of Care  [] Progress Note [x] Discharge      Referring Provider: Will Clayton DO    From:  Yolanda Gaffney, PT     Patient: Criselda Tabares (79 y.o. female) : 1945 Date: 2025   Medical Diagnosis: Low back pain, unspecified back pain laterality, unspecified chronicity, unspecified whether sciatica present [M54.50]    Treatment Diagnosis: Low back pain, L hip pain, LE strength    Plan of Care/Certification Expiration Date: 25   Progress Report Period from: 4/15/2025  to 2025    Visits to Date: 9 No Show: 0 Cancelled Appts: 1    OBJECTIVE:     Long Term Goals - Time Frame for Long Term Goals : 4-6 weeks  Goals Current/ Discharge status Status   Long Term Goal 1: Patient will improve bilateral LE strength to >/=4/5 for improved WBing tolerance during functional tasks. LTG 1 Current Status:: : RIGHT: flexion: 4/5 ; abduction: 5/5 ; extension: 3+/5 ; LEFT: flexion: 4+/5 ; abduction: 5/5 ; extension: 3+/5   Partially met   Long Term Goal 2: Patient will improve bilateral SBing by >/=25% to demonstrate improved flexibility and ADL tasks. LTG 2 Current Status:: : ~50% bilaterally without pain   Met   Long Term Goal 3: Patient will report being able to tolerate standing >/=30-45min with minimal to no pain to demonstrate improved pain tolerance. LTG 3 Current Status:: : patient reports being able to tolerate 20-30 min before pain sets in, but typically depends on what tasks she is doing. - reports improvements overall.   Partially met   Long Term Goal 4: Patient will demonstrate >/=8 point improvement on KELLEY for improved subjective function. LTG 4 Current Status:: : 15/50   Not Met     Body Structures, Functions, Activity Limitations Requiring Skilled Therapeutic Intervention:

## 2025-04-24 NOTE — TELEPHONE ENCOUNTER
Patient called and stated that she did not know her procedure was scheduled for May 1 physical therapy told her when she was there. She picked up her RX that she was supposed to take before her surgery and she did not know and took both yesterday ibuprofen explained to her while we were on the phone. The patient will need a new rx

## 2025-04-28 ENCOUNTER — TELEPHONE (OUTPATIENT)
Age: 80
End: 2025-04-28

## 2025-04-28 DIAGNOSIS — M48.061 SPINAL STENOSIS AT L4-L5 LEVEL: Primary | ICD-10-CM

## 2025-04-28 NOTE — TELEPHONE ENCOUNTER
Patient called and she took her vallum by accident last week . She needs another prescription sent to pharmacy . Patient had other questions but would not come in for a appointment. Please advise

## 2025-04-30 RX ORDER — DIAZEPAM 5 MG/1
TABLET ORAL
Qty: 2 TABLET | Refills: 0 | Status: SHIPPED | OUTPATIENT
Start: 2025-04-30 | End: 2027-04-27

## 2025-05-01 ENCOUNTER — HOSPITAL ENCOUNTER (OUTPATIENT)
Age: 80
Setting detail: OUTPATIENT SURGERY
Discharge: HOME | End: 2025-05-01
Attending: ORTHOPAEDIC SURGERY | Admitting: ORTHOPAEDIC SURGERY
Payer: MEDICARE

## 2025-05-01 ENCOUNTER — APPOINTMENT (OUTPATIENT)
Dept: GENERAL RADIOLOGY | Age: 80
End: 2025-05-01
Attending: ORTHOPAEDIC SURGERY
Payer: MEDICARE

## 2025-05-01 VITALS
HEIGHT: 66 IN | BODY MASS INDEX: 20.09 KG/M2 | SYSTOLIC BLOOD PRESSURE: 132 MMHG | OXYGEN SATURATION: 99 % | TEMPERATURE: 97.4 F | WEIGHT: 125 LBS | RESPIRATION RATE: 12 BRPM | DIASTOLIC BLOOD PRESSURE: 76 MMHG | HEART RATE: 64 BPM

## 2025-05-01 DIAGNOSIS — R52 PAIN: ICD-10-CM

## 2025-05-01 PROCEDURE — 7100000011 HC PHASE II RECOVERY - ADDTL 15 MIN: Performed by: ORTHOPAEDIC SURGERY

## 2025-05-01 PROCEDURE — 3600000004 HC SURGERY LEVEL 4 BASE: Performed by: ORTHOPAEDIC SURGERY

## 2025-05-01 PROCEDURE — 2709999900 HC NON-CHARGEABLE SUPPLY: Performed by: ORTHOPAEDIC SURGERY

## 2025-05-01 PROCEDURE — 6360000002 HC RX W HCPCS: Performed by: ORTHOPAEDIC SURGERY

## 2025-05-01 PROCEDURE — 7100000010 HC PHASE II RECOVERY - FIRST 15 MIN: Performed by: ORTHOPAEDIC SURGERY

## 2025-05-01 PROCEDURE — 3600000014 HC SURGERY LEVEL 4 ADDTL 15MIN: Performed by: ORTHOPAEDIC SURGERY

## 2025-05-01 RX ORDER — BUPIVACAINE HYDROCHLORIDE 2.5 MG/ML
INJECTION, SOLUTION EPIDURAL; INFILTRATION; INTRACAUDAL; PERINEURAL PRN
Status: DISCONTINUED | OUTPATIENT
Start: 2025-05-01 | End: 2025-05-01 | Stop reason: ALTCHOICE

## 2025-05-01 RX ORDER — LIDOCAINE HYDROCHLORIDE 10 MG/ML
INJECTION, SOLUTION EPIDURAL; INFILTRATION; INTRACAUDAL; PERINEURAL PRN
Status: DISCONTINUED | OUTPATIENT
Start: 2025-05-01 | End: 2025-05-01 | Stop reason: ALTCHOICE

## 2025-05-01 RX ORDER — TRIAMCINOLONE ACETONIDE 40 MG/ML
INJECTION, SUSPENSION INTRA-ARTICULAR; INTRAMUSCULAR PRN
Status: DISCONTINUED | OUTPATIENT
Start: 2025-05-01 | End: 2025-05-01 | Stop reason: ALTCHOICE

## 2025-05-01 ASSESSMENT — PAIN - FUNCTIONAL ASSESSMENT: PAIN_FUNCTIONAL_ASSESSMENT: 0-10

## 2025-05-01 NOTE — DISCHARGE INSTRUCTIONS
Will Vazquez, DO  Orthopedics and Sports Medicine  [address]  [phone]        DR. VAZQUEZ POST OP INJECTION INSTRUCTIONS    Keep a pain journal for 24 hours after your injection to monitor your response to the injection. See from below.    Anesthesia Precautions: You may feel drowsy, lightheaded, weak and/or unsteady. Pain medication can add to this effect  For 24 hours you should:  Have a responsible adult remain with you  Do not operate a vehicle/motorcycle, power tools, or anything that requires concentration  Do not make important decisions or sign legal documents  Do not drink alcohol (which includes beer and wine)  Drink liquids and eat a light meal on the day of surgery. Start your normal diet tomorrow    Medications  If needed, start over-the-counter or prescribed pain medication when the block starts wearing off  If prescribed a pain medication, it may cause constipation, so drink plenty of fluids, and use a stool softener or laxative (Miralax, Colace, Senokot-S, Dulcolax, etc.)  Resume your own medications  Resume blood thinners and/or nonsteroidal anti-inflammatories (NSAIDs) 24 hours after you get home    Activities  Resume normal activities as tolerated. Start slowly and gradually increase activity    General Instructions  Keep bandage clean and dry for 24 hours, then remove  You may shower after 24 hours. Do not soak in a bath tub, hot tub, or swim for 2 days  You may notice soreness or pain in the area of the shot for the first 48 hours.  Relief may take up to a week, to fully take effect  Ice packs or heating pad 20 minutes per hour to affected site, if working for pain relief. Always protect your skin from the cold or heat (skin check every 2 hours).   Signs of skin irritation: redness, swelling, welts, burns, itching, and/or change in skin appearance    Side Effects of Steroids  Difficulty sleeping, increased sweating, headaches, facial flushing, swelling of your arms and/or legs,  Injection  Rate Pain 0-10: 24 hours Post Injection  Rate Pain 0-10:

## 2025-05-01 NOTE — OP NOTE
Caudal Epidural Steroid Injection    Patient Name: Criselda Tabares  : 1945  MRN: [id number]  Patient Location: [facility] OR Pool/NONE   Account: [id number]     Date of Surgery: 2025   Surgeon(s):  Will Clayton DO    Pre-op Diagnosis: L4-5 spinal stenosis    Post-Op Diagnosis: Same    Surgical Procedure(s): Caudal epidural steroid injection    Assistants: * No surgical staff found *.    Anesthesia type/spinal/blocks/exparel: Local    Estimated blood loss: None    Specimens: None    Drains: None    Implants: None    Complications: None    Disposition: Returned the PACU in stable condition.    HPI/indication for surgery: The patient is 79-year-old female who presents with low back pain and radiculopathy.  Preoperative MRI demonstrates L4-5 spinal stenosis.  The patient failed all nonoperative measures.  This condition is affecting their activities of daily living.  Risks and potential complications were explained to the patient. They understood the risks and potential complications and agreed to proceed.  A surgical consent was obtained and placed on the patient's chart.    DESCRIPTION OF PROCEDURE: The patient was met in the preoperative holding area and I placed my initials upon the operative site. The patient was then taken to the operating room and placed prone on a radiolucent table. Monitored anesthesia care was instituted. The area over the sacrococcygeal region was prepped and draped in the usual sterile fashion. The low back, sacral hiatus and coccyx were used as bony landmarks. An intraoperative fluoroscopy was utilized to confirm the entry site. A surgical time-out was performed. The sacral hiatus was well demarcated and palpated, followed by introduction of 1% lidocaine without epinephrine into the subcutaneous tissue.  This was then followed by introduction of the Tuohy  needle into the sacral hiatus.  As the spinal needle was introduced, a loss of resistance sensation was felt. Needle position was

## 2025-05-01 NOTE — PROGRESS NOTES
Pt reports no numbness/ tingling to the lower extremities. Pt able to stand at the bedside and march in place without difficulty.

## 2025-05-16 ENCOUNTER — OFFICE VISIT (OUTPATIENT)
Age: 80
End: 2025-05-16
Payer: MEDICARE

## 2025-05-16 VITALS — BODY MASS INDEX: 20.09 KG/M2 | HEIGHT: 66 IN | TEMPERATURE: 96.4 F | WEIGHT: 125 LBS | OXYGEN SATURATION: 96 %

## 2025-05-16 DIAGNOSIS — M43.16 SPONDYLOLISTHESIS AT L4-L5 LEVEL: Primary | ICD-10-CM

## 2025-05-16 PROCEDURE — G8420 CALC BMI NORM PARAMETERS: HCPCS | Performed by: ORTHOPAEDIC SURGERY

## 2025-05-16 PROCEDURE — 1123F ACP DISCUSS/DSCN MKR DOCD: CPT | Performed by: ORTHOPAEDIC SURGERY

## 2025-05-16 PROCEDURE — G8399 PT W/DXA RESULTS DOCUMENT: HCPCS | Performed by: ORTHOPAEDIC SURGERY

## 2025-05-16 PROCEDURE — 1090F PRES/ABSN URINE INCON ASSESS: CPT | Performed by: ORTHOPAEDIC SURGERY

## 2025-05-16 PROCEDURE — 1159F MED LIST DOCD IN RCRD: CPT | Performed by: ORTHOPAEDIC SURGERY

## 2025-05-16 PROCEDURE — 1036F TOBACCO NON-USER: CPT | Performed by: ORTHOPAEDIC SURGERY

## 2025-05-16 PROCEDURE — G8427 DOCREV CUR MEDS BY ELIG CLIN: HCPCS | Performed by: ORTHOPAEDIC SURGERY

## 2025-05-16 PROCEDURE — 99213 OFFICE O/P EST LOW 20 MIN: CPT | Performed by: ORTHOPAEDIC SURGERY

## 2025-05-16 NOTE — PROGRESS NOTES
Subjective:      Patient ID: Criselda Tabares is a 79 y.o. female who presents today for:  Chief Complaint   Patient presents with    Post-Op Check     Patient presents to clinic for Post Op Check CAUDAL EPIDURAL STEROID INJECTION on 5/1/25.  Relief:Yes  Symptoms: Tingling in Right Shin radiates to the Right foot  Pain Level: 0/10       Subjective/Objective/Assessment/Plan:     SUBJECTIVE -low back pain with bilateral leg radiculopathy.  She states has been ongoing for roughly a year.    OBJECTIVE - The patient can rise up on their toes and rise up on her heels.  5 out of 5 hip flexion and knee extension strength bilaterally.  Sensation intact bilaterally in the lower extremities from L2-S1.  Positive seated straight leg raise    ASSESSMENT    Diagnosis Orders   1. Spondylolisthesis at L4-L5 level                PLAN -I reviewed her vitamin D level which was 56.9.   She is osteoporotic with a T-score of -2.6.  She is on Prolia.  I want to switch her to Evenity for 1 year and then we will put her back on Prolia.  She wants to proceed with an injection.  She is in need of a spinal fusion L4-5 due to the spondylolisthesis and severe spinal stenosis at that level.  She has other levels in her back which are pain generators.  I told her if I did surgery would want her on Evenity for at least 6 months because of her low bone density.  My goal would be 50% improvement in symptoms.    She is here for follow-up after undergoing a caudal epidural steroid injection.  She does not report any pain today.  She states that she has also had great relief from radiofrequency ablation in her back in the past so we will keep that in consideration as we move forward.  I can see her back on an as-needed basis.      FLUORO FOR SURGICAL PROCEDURES  Narrative: EXAMINATION:  SPOT FLUOROSCOPIC IMAGES    5/1/2025 1:58 pm    TECHNIQUE:  Fluoroscopy was provided by the radiology department for procedure.  Radiologist was not present during

## 2025-05-21 ENCOUNTER — HOSPITAL ENCOUNTER (OUTPATIENT)
Dept: INFUSION THERAPY | Age: 80
Setting detail: INFUSION SERIES
Discharge: HOME | End: 2025-05-21
Payer: MEDICARE

## 2025-05-21 VITALS
DIASTOLIC BLOOD PRESSURE: 76 MMHG | OXYGEN SATURATION: 99 % | RESPIRATION RATE: 16 BRPM | HEART RATE: 69 BPM | TEMPERATURE: 95.6 F | SYSTOLIC BLOOD PRESSURE: 113 MMHG

## 2025-05-21 DIAGNOSIS — M81.0 AGE RELATED OSTEOPOROSIS, UNSPECIFIED PATHOLOGICAL FRACTURE PRESENCE: Primary | ICD-10-CM

## 2025-05-21 PROCEDURE — 96401 CHEMO ANTI-NEOPL SQ/IM: CPT

## 2025-05-21 PROCEDURE — 6360000002 HC RX W HCPCS: Performed by: ORTHOPAEDIC SURGERY

## 2025-05-21 RX ADMIN — ROMOSOZUMAB-AQQG 105 MG: 105 INJECTION, SOLUTION SUBCUTANEOUS at 11:14

## 2025-05-21 RX ADMIN — ROMOSOZUMAB-AQQG 105 MG: 105 INJECTION, SOLUTION SUBCUTANEOUS at 11:15

## 2025-05-21 NOTE — PROGRESS NOTES
[facility] OUTPATIENT INFUSION CENTER     PT arrived via:  [x] Ambulatory         [] Wheelchair  []With transport                [] Other:     [x]PT oriented to room and call light in reach.   [x] Vital signs obtained and are stable.   [x]Medication education provided and reviewed with patient.  1114- injections given, one in right arm and the other in the left arm, patient tolerated. AVS reviewed and given to patient. Patient left unit ambulatory. All equipment used in room cleaned.

## 2025-05-22 RX ORDER — ESCITALOPRAM OXALATE 20 MG/1
20 TABLET ORAL DAILY
Qty: 90 TABLET | Refills: 1 | Status: SHIPPED | OUTPATIENT
Start: 2025-05-22

## 2025-05-22 RX ORDER — ESCITALOPRAM OXALATE 20 MG/1
20 TABLET ORAL DAILY
Qty: 30 TABLET | OUTPATIENT
Start: 2025-05-22

## 2025-06-06 DIAGNOSIS — R51.9 CHRONIC NONINTRACTABLE HEADACHE, UNSPECIFIED HEADACHE TYPE: ICD-10-CM

## 2025-06-06 DIAGNOSIS — G89.29 CHRONIC NONINTRACTABLE HEADACHE, UNSPECIFIED HEADACHE TYPE: ICD-10-CM

## 2025-06-07 RX ORDER — NAPROXEN 250 MG/1
500 TABLET ORAL EVERY 12 HOURS PRN
Qty: 60 TABLET | Refills: 0 | Status: SHIPPED | OUTPATIENT
Start: 2025-06-07

## 2025-06-18 ENCOUNTER — HOSPITAL ENCOUNTER (OUTPATIENT)
Dept: INFUSION THERAPY | Age: 80
Setting detail: INFUSION SERIES
Discharge: HOME | End: 2025-06-18
Payer: MEDICARE

## 2025-06-18 VITALS
TEMPERATURE: 97.2 F | HEART RATE: 73 BPM | OXYGEN SATURATION: 95 % | DIASTOLIC BLOOD PRESSURE: 68 MMHG | RESPIRATION RATE: 16 BRPM | SYSTOLIC BLOOD PRESSURE: 104 MMHG

## 2025-06-18 DIAGNOSIS — M81.0 AGE RELATED OSTEOPOROSIS, UNSPECIFIED PATHOLOGICAL FRACTURE PRESENCE: Primary | ICD-10-CM

## 2025-06-18 PROCEDURE — 96401 CHEMO ANTI-NEOPL SQ/IM: CPT

## 2025-06-18 PROCEDURE — 6360000002 HC RX W HCPCS: Performed by: ORTHOPAEDIC SURGERY

## 2025-06-18 RX ADMIN — ROMOSOZUMAB-AQQG 105 MG: 105 INJECTION, SOLUTION SUBCUTANEOUS at 10:06

## 2025-06-18 ASSESSMENT — PAIN SCALES - GENERAL: PAINLEVEL_OUTOF10: 8

## 2025-06-18 ASSESSMENT — PAIN DESCRIPTION - LOCATION: LOCATION: HIP

## 2025-06-18 ASSESSMENT — PAIN DESCRIPTION - DESCRIPTORS: DESCRIPTORS: ACHING

## 2025-06-18 NOTE — PROGRESS NOTES
Patient arrived ambulatory to floor. Vital signs taken and stable. Patient oriented to room and call light. Call light within reach.    1006 - Evenity SubQ injection administered to bilateral upper arms. Pt tolerated well. No signs/symptoms of adverse reaction at this time. Reminder card given. Patient left the unit ambulatory. All equipment used in the care for this patient has been cleaned.

## 2025-06-25 DIAGNOSIS — G89.29 CHRONIC NONINTRACTABLE HEADACHE, UNSPECIFIED HEADACHE TYPE: ICD-10-CM

## 2025-06-25 DIAGNOSIS — R51.9 CHRONIC NONINTRACTABLE HEADACHE, UNSPECIFIED HEADACHE TYPE: ICD-10-CM

## 2025-06-26 RX ORDER — NAPROXEN 250 MG/1
500 TABLET ORAL EVERY 12 HOURS PRN
Qty: 60 TABLET | Refills: 1 | Status: SHIPPED | OUTPATIENT
Start: 2025-06-26

## 2025-07-16 ENCOUNTER — HOSPITAL ENCOUNTER (OUTPATIENT)
Dept: INFUSION THERAPY | Age: 80
Setting detail: INFUSION SERIES
Discharge: HOME | End: 2025-07-16
Payer: MEDICARE

## 2025-07-16 VITALS
TEMPERATURE: 96 F | OXYGEN SATURATION: 100 % | HEART RATE: 71 BPM | RESPIRATION RATE: 16 BRPM | SYSTOLIC BLOOD PRESSURE: 107 MMHG | DIASTOLIC BLOOD PRESSURE: 70 MMHG

## 2025-07-16 DIAGNOSIS — M81.0 AGE RELATED OSTEOPOROSIS, UNSPECIFIED PATHOLOGICAL FRACTURE PRESENCE: Primary | ICD-10-CM

## 2025-07-16 PROCEDURE — 96401 CHEMO ANTI-NEOPL SQ/IM: CPT

## 2025-07-16 PROCEDURE — 6360000002 HC RX W HCPCS: Performed by: ORTHOPAEDIC SURGERY

## 2025-07-16 RX ADMIN — ROMOSOZUMAB-AQQG 105 MG: 105 INJECTION, SOLUTION SUBCUTANEOUS at 10:05

## 2025-07-16 ASSESSMENT — PAIN SCALES - GENERAL: PAINLEVEL_OUTOF10: 3

## 2025-07-16 ASSESSMENT — PAIN DESCRIPTION - DESCRIPTORS: DESCRIPTORS: ACHING;PINS AND NEEDLES

## 2025-07-16 ASSESSMENT — PAIN DESCRIPTION - PAIN TYPE: TYPE: CHRONIC PAIN

## 2025-07-16 ASSESSMENT — PAIN DESCRIPTION - ORIENTATION: ORIENTATION: LEFT;RIGHT;LOWER

## 2025-07-16 NOTE — PROGRESS NOTES
[facility] OUTPATIENT INFUSION CENTER     PT arrived via:  [x] Ambulatory         [] Wheelchair  []With transport                [] Other:     [x]PT oriented to room and call light in reach.   [x] Vital signs obtained and are stable.   [x]Medication education provided and reviewed with patient.  1005- injection given, patient tolerated. AVS given and reviewed with patient. Patient left unit ambulatory. All equipment used in room cleaned.

## 2025-08-14 ENCOUNTER — HOSPITAL ENCOUNTER (OUTPATIENT)
Dept: INFUSION THERAPY | Age: 80
Setting detail: INFUSION SERIES
Discharge: HOME | End: 2025-08-14
Payer: MEDICARE

## 2025-08-14 VITALS
OXYGEN SATURATION: 94 % | SYSTOLIC BLOOD PRESSURE: 99 MMHG | TEMPERATURE: 97.9 F | DIASTOLIC BLOOD PRESSURE: 64 MMHG | HEART RATE: 81 BPM | RESPIRATION RATE: 18 BRPM

## 2025-08-14 DIAGNOSIS — M81.0 AGE RELATED OSTEOPOROSIS, UNSPECIFIED PATHOLOGICAL FRACTURE PRESENCE: Primary | ICD-10-CM

## 2025-08-14 PROCEDURE — 6360000002 HC RX W HCPCS: Performed by: ORTHOPAEDIC SURGERY

## 2025-08-14 PROCEDURE — 96401 CHEMO ANTI-NEOPL SQ/IM: CPT

## 2025-08-14 RX ADMIN — ROMOSOZUMAB-AQQG 105 MG: 105 INJECTION, SOLUTION SUBCUTANEOUS at 11:37

## 2025-08-14 ASSESSMENT — PAIN DESCRIPTION - LOCATION: LOCATION: BACK;FOOT;HIP

## 2025-08-14 ASSESSMENT — PAIN DESCRIPTION - PAIN TYPE: TYPE: CHRONIC PAIN

## 2025-08-14 ASSESSMENT — PAIN DESCRIPTION - DESCRIPTORS: DESCRIPTORS: ACHING

## 2025-08-14 ASSESSMENT — PAIN DESCRIPTION - ORIENTATION: ORIENTATION: RIGHT;LEFT;LOWER

## 2025-08-14 ASSESSMENT — PAIN SCALES - GENERAL: PAINLEVEL_OUTOF10: 4

## (undated) DEVICE — GLOVE ORANGE PI 8 1/2   MSG9085

## (undated) DEVICE — LABEL MED MINI W/ MARKER

## (undated) DEVICE — SHEET,DRAPE,53X77,STERILE: Brand: MEDLINE

## (undated) DEVICE — APPLICATOR MEDICATED 10.5 CC SOLUTION HI LT ORNG CHLORAPREP

## (undated) DEVICE — COUNTER NDL 40 COUNT HLD 70 FOAM BLK ADH W/ MAG

## (undated) DEVICE — SPONGE GZ W4XL4IN RAYON POLY CVR W/NONWOVEN FAB STRL 2/PK

## (undated) DEVICE — SYRINGE MED 10ML LUERLOCK TIP W/O SFTY DISP

## (undated) DEVICE — SYRINGE MED 30ML STD CLR PLAS LUERLOCK TIP N CTRL DISP

## (undated) DEVICE — NEEDLE HYPO 25GA L1.5IN BLU POLYPR HUB S STL REG BVL STR

## (undated) DEVICE — CUSHION PRONEVIEW L HD NK FOAM

## (undated) DEVICE — CONTAINER,SPECIMEN,OR STERILE,4OZ: Brand: MEDLINE

## (undated) DEVICE — NEEDLE EPI L3.5IN DIA17GA TUOHY WNG CLR HUB PERIFIX

## (undated) DEVICE — HYPODERMIC SAFETY NEEDLE: Brand: MAGELLAN